# Patient Record
Sex: MALE | Race: WHITE
[De-identification: names, ages, dates, MRNs, and addresses within clinical notes are randomized per-mention and may not be internally consistent; named-entity substitution may affect disease eponyms.]

---

## 2017-01-27 ENCOUNTER — HOSPITAL ENCOUNTER (OUTPATIENT)
Dept: HOSPITAL 74 - FASU | Age: 72
LOS: 1 days | Discharge: HOME | End: 2017-01-28
Attending: SURGERY
Payer: COMMERCIAL

## 2017-01-27 VITALS — HEART RATE: 80 BPM

## 2017-01-27 VITALS — BODY MASS INDEX: 29 KG/M2

## 2017-01-27 DIAGNOSIS — K80.10: Primary | ICD-10-CM

## 2017-01-27 PROCEDURE — 0FT44ZZ RESECTION OF GALLBLADDER, PERCUTANEOUS ENDOSCOPIC APPROACH: ICD-10-PCS | Performed by: SURGERY

## 2017-01-27 PROCEDURE — 3E1M38Z IRRIGATION OF PERITONEAL CAVITY USING IRRIGATING SUBSTANCE, PERCUTANEOUS APPROACH: ICD-10-PCS | Performed by: SURGERY

## 2017-01-27 RX ADMIN — INSULIN ASPART SCH VIAL: 100 INJECTION, SOLUTION INTRAVENOUS; SUBCUTANEOUS at 11:55

## 2017-01-27 RX ADMIN — INSULIN ASPART SCH: 100 INJECTION, SOLUTION INTRAVENOUS; SUBCUTANEOUS at 23:56

## 2017-01-27 RX ADMIN — INSULIN ASPART SCH VIAL: 100 INJECTION, SOLUTION INTRAVENOUS; SUBCUTANEOUS at 18:44

## 2017-01-27 RX ADMIN — MEMANTINE SCH MG: 10 TABLET ORAL at 21:28

## 2017-01-27 NOTE — OP
DATE OF OPERATION:  01/27/2017

 

PREOPERATIVE DIAGNOSES:  Symptomatic cholelithiasis, chronic cholecystitis, 
right

upper quadrant abdominal pain.

 

POSTOPERATIVE DIAGNOSES:  Symptomatic cholelithiasis, chronic cholecystitis, 
right

upper quadrant abdominal pain.

 

PROCEDURE:  Laparoscopic cholecystectomy, peritoneal lavage, extensive lysis of

adhesions.



SURGEON: Benny Alonzo M.D.



ASSISTANT: Cornelio Mcgill D.O.



ANESTHESIA: Hayes Buchanan M.D. (general)

 

ESTIMATED BLOOD LOSS:  Minimal.

 

SPECIMEN:  Gallbladder.

 

INDICATION FOR PROCEDURE:  This is a 71-year-old gentleman who has had a 
multitude of

medical issues who presents with episodic right upper quadrant pain.  Ultrasound

workup demonstrated findings to be consistent with chronic cholecystitis, 
biliary

colic, and cholelithiasis.  The patient is here today for a laparoscopic

cholecystectomy.

 

The patient underwent an extensive lysis of adhesions which will be dictated in 
the

operative report due to the fact of his open prostatectomy.

 

Patient identified and appropriately positioned on the operating room table.  
After

placement of general anesthesia, the abdomen prepped and draped in the usual 
sterile

fashion with ChloraPrep.  An infraumbilical incision was made, deepened through

subcutaneous tissue.  A cutdown was performed on the anterior abdominal wall 
fascia. 

Peritoneum incised.  Under direct vision, a port was placed.  The remaining 3 
ports

were placed under direct vision.  An epigastric 5-mm port.  Placement of this 
camera

and port revealed multiple abdominal adhesions to the anterior abdominal wall 
in the

right side of the abdomen and right upper quadrant.  These adhesions were then

subsequently taken down sharply.  The adhesions consisted of omentum.  There 
was also

omentum that was plastered to the body and dome of the gallbladder and edge of 
the

liver.  These adhesions were taken down in a similar fashion.  The lysis of 
adhesions

was approximately 50% of the operation just that was performed taking down 
adhesions.

 

Once the adhesions were subsequently taken down, the remaining 2 ports in the 
right

upper quadrant were placed, all 5 mm, under direct vision.  The gallbladder 
reflected

over the dome of the liver in standard fashion.  Going from the neck and 
infundibulum

of the gallbladder, the cystic duct was subsequently identified.  The cystic 
duct was

circumferentially isolated, clipped, and then divided.  The cystic artery 
identified

more posteriorly along with the lymph node.  The artery and its branch were

subsequently clipped and then divided.  The gallbladder itself was removed from 
the

liver bed with electrocautery.  Prior to complete removal, the liver bed was

irrigated, the operative field examined and noted to be hemostatic.

 

The area that underwent extensive lysis of adhesions was irrigated and noted to 
be

hemostatic as well.  The gallbladder brought out through the umbilical port 
site. 

The ports removed.  Port sites were hemostatic.  The fascia at the umbilical 
port

site reapproximated with interrupted 0 Vicryl suture.  All skin closed with 4-0

subcuticular Biosyn followed by Dermabond.

 

At the conclusion of the case, sponge and needle counts were correct.

 

ATTESTATION:  Brief operative note handwritten on the preprinted form.  [facility]

will be queried prior to giving any narcotics.

 

 

ENEDINA MONK [id number]

DD: 01/27/2017 11:02

DT: 01/27/2017 12:02

Job #:  [id number]

 

cc:  MD Derek Chavis MD

MTDD

## 2017-01-28 VITALS — SYSTOLIC BLOOD PRESSURE: 158 MMHG | TEMPERATURE: 98.9 F | DIASTOLIC BLOOD PRESSURE: 86 MMHG

## 2017-01-28 RX ADMIN — MEMANTINE SCH MG: 10 TABLET ORAL at 10:01

## 2017-01-28 RX ADMIN — INSULIN ASPART SCH: 100 INJECTION, SOLUTION INTRAVENOUS; SUBCUTANEOUS at 07:00

## 2017-01-30 NOTE — PATH
Surgical Pathology Report



Patient Name:  FARIHA JEFFREY

Accession #:  

[facility]. Rec. #:  [id number]                                                        

   /Age/Gender:  1945 (Age: 71) / M

Account:  [id number]                                                          

             Location: Pending sale to [facility] 

Taken:  2017

Received:  2017

Reported:  2017

Physicians:  Benny Alonzo

  



Specimen(s) Received

 GALLBLADDER 





Clinical History

Cholecystitis, chronic cholelithiasis







Final Diagnosis

GALLBLADDER, CHOLECYSTECTOMY:

CHRONIC CHOLECYSTITIS AND CHOLELITHIASIS.

BENIGN REACTIVE PERICYSTIC LYMPH NODE PRESENT.





***Electronically Signed***

Kal Amaral M.D.





Gross Description

Received in formalin, labeled "gallbladder," is a 9.0 x 2.8 x 1.5 cm.

gallbladder with a 0.2 cm. in length portion of cystic duct attached. There is a

0.7 cm in greatest dimension tan-pink periductal lymph node present. The outer

surface is tan-gray and varies from smooth to shaggy. The lumen contains green,

tenacious bile as well as multiple black, irregular choleliths averaging 0.4 cm

in greatest dimension. The mucosa is tan and velvety. The wall of the

gallbladder averages 0.1 cm. in thickness. Representative sections including one

whole bisected lymph node are submitted in one cassette.

## 2018-06-04 PROBLEM — Z00.00 ENCOUNTER FOR PREVENTIVE HEALTH EXAMINATION: Status: ACTIVE | Noted: 2018-06-04

## 2018-06-09 DIAGNOSIS — Z86.69 PERSONAL HISTORY OF OTHER DISEASES OF THE NERVOUS SYSTEM AND SENSE ORGANS: ICD-10-CM

## 2018-06-09 DIAGNOSIS — Z81.8 FAMILY HISTORY OF OTHER MENTAL AND BEHAVIORAL DISORDERS: ICD-10-CM

## 2018-06-09 DIAGNOSIS — Z86.79 PERSONAL HISTORY OF OTHER DISEASES OF THE CIRCULATORY SYSTEM: ICD-10-CM

## 2018-06-09 DIAGNOSIS — Z82.49 FAMILY HISTORY OF ISCHEMIC HEART DISEASE AND OTHER DISEASES OF THE CIRCULATORY SYSTEM: ICD-10-CM

## 2018-06-09 DIAGNOSIS — M72.0 PALMAR FASCIAL FIBROMATOSIS [DUPUYTREN]: ICD-10-CM

## 2018-06-09 DIAGNOSIS — H33.21 SEROUS RETINAL DETACHMENT, RIGHT EYE: ICD-10-CM

## 2018-06-09 DIAGNOSIS — Z87.19 PERSONAL HISTORY OF OTHER DISEASES OF THE DIGESTIVE SYSTEM: ICD-10-CM

## 2018-06-09 DIAGNOSIS — Z87.39 PERSONAL HISTORY OF OTHER DISEASES OF THE MUSCULOSKELETAL SYSTEM AND CONNECTIVE TISSUE: ICD-10-CM

## 2018-06-12 ENCOUNTER — APPOINTMENT (OUTPATIENT)
Dept: CARDIOLOGY | Facility: CLINIC | Age: 73
End: 2018-06-12

## 2018-06-12 ENCOUNTER — NON-APPOINTMENT (OUTPATIENT)
Age: 73
End: 2018-06-12

## 2018-06-12 VITALS
TEMPERATURE: 98.2 F | BODY MASS INDEX: 31.32 KG/M2 | HEIGHT: 65 IN | DIASTOLIC BLOOD PRESSURE: 80 MMHG | RESPIRATION RATE: 12 BRPM | SYSTOLIC BLOOD PRESSURE: 169 MMHG | HEART RATE: 74 BPM | WEIGHT: 188 LBS | OXYGEN SATURATION: 98 %

## 2018-06-12 DIAGNOSIS — F02.80 ALZHEIMER'S DISEASE, UNSPECIFIED: ICD-10-CM

## 2018-06-12 DIAGNOSIS — G30.9 ALZHEIMER'S DISEASE, UNSPECIFIED: ICD-10-CM

## 2018-06-12 RX ORDER — BISOPROLOL FUMARATE AND HYDROCHLOROTHIAZIDE 10; 6.25 MG/1; MG/1
10-6.25 TABLET, COATED ORAL DAILY
Refills: 0 | Status: DISCONTINUED | COMMUNITY
End: 2018-06-12

## 2018-06-26 ENCOUNTER — APPOINTMENT (OUTPATIENT)
Dept: CARDIOLOGY | Facility: CLINIC | Age: 73
End: 2018-06-26

## 2018-06-26 VITALS
HEART RATE: 92 BPM | TEMPERATURE: 97.9 F | BODY MASS INDEX: 30.66 KG/M2 | OXYGEN SATURATION: 97 % | HEIGHT: 65 IN | SYSTOLIC BLOOD PRESSURE: 163 MMHG | WEIGHT: 184 LBS | DIASTOLIC BLOOD PRESSURE: 94 MMHG | RESPIRATION RATE: 12 BRPM

## 2018-06-26 RX ORDER — CARVEDILOL 6.25 MG/1
6.25 TABLET, FILM COATED ORAL
Qty: 60 | Refills: 3 | Status: DISCONTINUED | COMMUNITY
Start: 2018-06-12 | End: 2018-06-26

## 2018-07-17 ENCOUNTER — MESSAGE (OUTPATIENT)
Age: 73
End: 2018-07-17

## 2018-07-17 RX ORDER — VALSARTAN 320 MG/1
320 TABLET, COATED ORAL DAILY
Refills: 0 | Status: DISCONTINUED | COMMUNITY
End: 2018-07-17

## 2019-05-29 ENCOUNTER — RX RENEWAL (OUTPATIENT)
Age: 74
End: 2019-05-29

## 2019-05-30 ENCOUNTER — RX RENEWAL (OUTPATIENT)
Age: 74
End: 2019-05-30

## 2019-06-25 ENCOUNTER — APPOINTMENT (OUTPATIENT)
Dept: CARDIOLOGY | Facility: CLINIC | Age: 74
End: 2019-06-25

## 2019-07-16 ENCOUNTER — NON-APPOINTMENT (OUTPATIENT)
Age: 74
End: 2019-07-16

## 2019-07-16 ENCOUNTER — APPOINTMENT (OUTPATIENT)
Dept: CARDIOLOGY | Facility: CLINIC | Age: 74
End: 2019-07-16
Payer: MEDICARE

## 2019-07-16 VITALS
BODY MASS INDEX: 32.02 KG/M2 | SYSTOLIC BLOOD PRESSURE: 170 MMHG | WEIGHT: 192.44 LBS | OXYGEN SATURATION: 96 % | DIASTOLIC BLOOD PRESSURE: 90 MMHG | RESPIRATION RATE: 12 BRPM | HEART RATE: 77 BPM

## 2019-07-16 PROCEDURE — 99214 OFFICE O/P EST MOD 30 MIN: CPT

## 2019-07-16 PROCEDURE — 93000 ELECTROCARDIOGRAM COMPLETE: CPT

## 2019-07-16 NOTE — ASSESSMENT
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 Patient currently denies exertional chest pain or dyspnea. \par \par Patient's blood pressure is not controlled, but patient ran out of his losartan and HCTZ ~ 10 days ago. \par Patient was instructed to call the office next time he runs out of his medications.\par Will continue carvedilol 12.5 mg po bid, HCTZ 12.5 mg po daily, amlodipine 10 mg po daily, and losartan 100 mg po daily.\par Will continue aspirin & atorvastatin.\par \par RTC in 2 weeks for BP follow-up to ensure adequate control on current regimen.

## 2019-07-16 NOTE — HISTORY OF PRESENT ILLNESS
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. Patient presents today for follow-up for follow-up. He reports that he ran out of his HCTZ 12.5 mg po daily and losartan 100 mg po daily ~ 10 days ago. Prior to running out of his meds, he reports SBP in 140s at home. Patient denies chest pain, dyspnea, palpitations, syncope, edema, melena, hematochezia, or hematemesis.\par \par PMD: David Garcia MD

## 2019-07-16 NOTE — PHYSICAL EXAM
[General Appearance - Well Developed] : well developed [General Appearance - Well Nourished] : well nourished [General Appearance - In No Acute Distress] : no acute distress [Normal Conjunctiva] : the conjunctiva exhibited no abnormalities [Normal Oropharynx] : normal oropharynx [FreeTextEntry1] : No JVD present [] : no respiratory distress [Respiration, Rhythm And Depth] : normal respiratory rhythm and effort [Auscultation Breath Sounds / Voice Sounds] : lungs were clear to auscultation bilaterally [Abnormal Walk] : normal gait [Nail Clubbing] : no clubbing of the fingernails [Cyanosis, Localized] : no localized cyanosis [Oriented To Time, Place, And Person] : oriented to person, place, and time [Affect] : the affect was normal [Mood] : the mood was normal [Normal Rate] : normal [Rhythm Regular] : regular [Normal S1] : normal S1 [Normal S2] : normal S2 [S3] : no S3 [S4] : no S4 [No Murmur] : no murmurs heard [Right Carotid Bruit] : no bruit heard over the right carotid [Left Carotid Bruit] : no bruit heard over the left carotid [2+] : left 2+ [No Pitting Edema] : no pitting edema present

## 2019-07-30 ENCOUNTER — APPOINTMENT (OUTPATIENT)
Dept: CARDIOLOGY | Facility: CLINIC | Age: 74
End: 2019-07-30
Payer: MEDICARE

## 2019-07-30 VITALS
BODY MASS INDEX: 31.95 KG/M2 | RESPIRATION RATE: 12 BRPM | OXYGEN SATURATION: 98 % | DIASTOLIC BLOOD PRESSURE: 75 MMHG | WEIGHT: 192 LBS | SYSTOLIC BLOOD PRESSURE: 142 MMHG | HEART RATE: 88 BPM

## 2019-07-30 PROCEDURE — 99213 OFFICE O/P EST LOW 20 MIN: CPT

## 2019-07-30 NOTE — HISTORY OF PRESENT ILLNESS
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 Patient presents today for follow-up for blood pressure follow-up after resuming his HCTZ 12.5 mg po daily and losartan 100 mg po daily after running out for several weeks. Patient denies chest pain, dyspnea, palpitations, syncope, edema, melena, hematochezia, or hematemesis.\par \par PMD: David Garcia MD

## 2019-07-30 NOTE — PHYSICAL EXAM
[General Appearance - Well Developed] : well developed [General Appearance - Well Nourished] : well nourished [General Appearance - In No Acute Distress] : no acute distress [Normal Conjunctiva] : the conjunctiva exhibited no abnormalities [Normal Oropharynx] : normal oropharynx [] : no respiratory distress [Respiration, Rhythm And Depth] : normal respiratory rhythm and effort [Auscultation Breath Sounds / Voice Sounds] : lungs were clear to auscultation bilaterally [Abnormal Walk] : normal gait [Nail Clubbing] : no clubbing of the fingernails [Cyanosis, Localized] : no localized cyanosis [Oriented To Time, Place, And Person] : oriented to person, place, and time [Normal Rate] : normal [Affect] : the affect was normal [Mood] : the mood was normal [Normal S1] : normal S1 [Normal S2] : normal S2 [Rhythm Regular] : regular [No Murmur] : no murmurs heard [2+] : left 2+ [No Pitting Edema] : no pitting edema present [FreeTextEntry1] : No JVD present [S3] : no S3 [S4] : no S4 [Right Carotid Bruit] : no bruit heard over the right carotid [Left Carotid Bruit] : no bruit heard over the left carotid

## 2019-07-30 NOTE — ASSESSMENT
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 Patient currently denies exertional chest pain or dyspnea. \par \par Patient's blood pressure is better controlled since restarting losartan and HCTZ on 7/16/19 after running out of his medications.\par Will continue carvedilol 12.5 mg po bid, HCTZ 12.5 mg po daily, amlodipine 10 mg po daily, and losartan 100 mg po daily.\par Patient was instructed to call the office next time he runs out of his medications.\par Will continue aspirin & atorvastatin.\par \par RTC in 4 months

## 2020-11-10 ENCOUNTER — APPOINTMENT (OUTPATIENT)
Dept: CARDIOLOGY | Facility: CLINIC | Age: 75
End: 2020-11-10

## 2020-11-24 ENCOUNTER — RESULT REVIEW (OUTPATIENT)
Age: 75
End: 2020-11-24

## 2020-11-24 ENCOUNTER — APPOINTMENT (OUTPATIENT)
Dept: CARDIOLOGY | Facility: CLINIC | Age: 75
End: 2020-11-24
Payer: MEDICARE

## 2020-11-24 ENCOUNTER — NON-APPOINTMENT (OUTPATIENT)
Age: 75
End: 2020-11-24

## 2020-11-24 VITALS
OXYGEN SATURATION: 96 % | RESPIRATION RATE: 12 BRPM | HEART RATE: 66 BPM | SYSTOLIC BLOOD PRESSURE: 130 MMHG | WEIGHT: 185 LBS | BODY MASS INDEX: 30.79 KG/M2 | TEMPERATURE: 97.9 F | DIASTOLIC BLOOD PRESSURE: 62 MMHG

## 2020-11-24 DIAGNOSIS — I73.9 PERIPHERAL VASCULAR DISEASE, UNSPECIFIED: ICD-10-CM

## 2020-11-24 DIAGNOSIS — Z23 ENCOUNTER FOR IMMUNIZATION: ICD-10-CM

## 2020-11-24 PROCEDURE — 99214 OFFICE O/P EST MOD 30 MIN: CPT

## 2020-11-24 PROCEDURE — 93000 ELECTROCARDIOGRAM COMPLETE: CPT

## 2020-11-24 PROCEDURE — G0008: CPT

## 2020-11-24 PROCEDURE — 90662 IIV NO PRSV INCREASED AG IM: CPT

## 2020-11-24 NOTE — HISTORY OF PRESENT ILLNESS
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 Patient presents today for follow-up. He denies exertional chest pain or dyspnea. However, he does report bilateral leg pain when walking but not at rest. Patient denies palpitations, syncope, edema, melena, hematochezia, or hematemesis.\par \par PMD: David Garcia MD

## 2020-11-24 NOTE — PHYSICAL EXAM
[General Appearance - Well Developed] : well developed [General Appearance - Well Nourished] : well nourished [General Appearance - In No Acute Distress] : no acute distress [Normal Conjunctiva] : the conjunctiva exhibited no abnormalities [Normal Oropharynx] : normal oropharynx [] : no respiratory distress [Respiration, Rhythm And Depth] : normal respiratory rhythm and effort [Auscultation Breath Sounds / Voice Sounds] : lungs were clear to auscultation bilaterally [Nail Clubbing] : no clubbing of the fingernails [Cyanosis, Localized] : no localized cyanosis [Oriented To Time, Place, And Person] : oriented to person, place, and time [Affect] : the affect was normal [Mood] : the mood was normal [Normal Rate] : normal [Rhythm Regular] : regular [Normal S1] : normal S1 [Normal S2] : normal S2 [No Murmur] : no murmurs heard [2+] : left 2+ [No Pitting Edema] : no pitting edema present [FreeTextEntry1] : No JVD present [S3] : no S3 [S4] : no S4 [Right Carotid Bruit] : no bruit heard over the right carotid [Left Carotid Bruit] : no bruit heard over the left carotid [1+] : left 1+

## 2020-11-24 NOTE — ASSESSMENT
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 \par \par Patient is clinically stable from CAD standpoint and will continue to monitor.\par Will continue current meds (clopidogrel and bisoprolol). However, will change ezetimibe-simvastatin (10 mg/20 mg) to atorvastatin 40 mg po daily for now now given known CAD. \par Will try to obtain recent labs from PMD's office for review.\par \par Given reported bilateral leg when walking (claudication) and diminished peripheral pulses, will order bilateral arterial Doppler for evaluation of PAD. \par \par Blood pressure is adequately controlled on bisoprolol 10 mg po daily and nifedipine ER 60 mg po daily. \par Will continue to monitor on current regimen.\par \par Influenza vaccination was given today in the office at patient's request.

## 2020-12-08 ENCOUNTER — NON-APPOINTMENT (OUTPATIENT)
Age: 75
End: 2020-12-08

## 2020-12-08 ENCOUNTER — APPOINTMENT (OUTPATIENT)
Dept: CARDIOLOGY | Facility: CLINIC | Age: 75
End: 2020-12-08
Payer: MEDICARE

## 2020-12-08 VITALS
BODY MASS INDEX: 32.28 KG/M2 | HEART RATE: 69 BPM | WEIGHT: 194 LBS | OXYGEN SATURATION: 96 % | DIASTOLIC BLOOD PRESSURE: 65 MMHG | SYSTOLIC BLOOD PRESSURE: 124 MMHG | TEMPERATURE: 97.6 F | RESPIRATION RATE: 12 BRPM

## 2020-12-08 PROCEDURE — 99213 OFFICE O/P EST LOW 20 MIN: CPT

## 2020-12-08 NOTE — REASON FOR VISIT
[Follow-Up - Clinic] : a clinic follow-up of [Coronary Artery Disease] : coronary artery disease [Hyperlipidemia] : hyperlipidemia [Hypertension] : hypertension [FreeTextEntry1] : leg edema

## 2020-12-08 NOTE — PHYSICAL EXAM
[General Appearance - Well Developed] : well developed [General Appearance - Well Nourished] : well nourished [General Appearance - In No Acute Distress] : no acute distress [Normal Conjunctiva] : the conjunctiva exhibited no abnormalities [Normal Oropharynx] : normal oropharynx [] : no respiratory distress [Respiration, Rhythm And Depth] : normal respiratory rhythm and effort [Auscultation Breath Sounds / Voice Sounds] : lungs were clear to auscultation bilaterally [Nail Clubbing] : no clubbing of the fingernails [Cyanosis, Localized] : no localized cyanosis [Oriented To Time, Place, And Person] : oriented to person, place, and time [Affect] : the affect was normal [Mood] : the mood was normal [Normal Rate] : normal [Rhythm Regular] : regular [Normal S1] : normal S1 [Normal S2] : normal S2 [No Murmur] : no murmurs heard [2+] : left 2+ [1+] : left 1+ [No Pitting Edema] : no pitting edema present [FreeTextEntry1] : No JVD present [S3] : no S3 [S4] : no S4 [Right Carotid Bruit] : no bruit heard over the right carotid [Left Carotid Bruit] : no bruit heard over the left carotid

## 2020-12-08 NOTE — REVIEW OF SYSTEMS
[Negative] : Heme/Lymph [Lower Ext Edema] : lower extremity edema [Shortness Of Breath] : no shortness of breath [Dyspnea on exertion] : not dyspnea during exertion [Chest Pain] : no chest pain [Palpitations] : no palpitations

## 2020-12-08 NOTE — ASSESSMENT
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 \par \par Patient with reported intermittent leg edema. However, only noted to have trace pedal edema today in office.\par Will start furosemide 20 mg po daily and return in 2-3 weeks for follow-up and labs.\par His leg edema may be due to nifedipine.\par \par Patient is clinically stable from CAD standpoint and will continue to monitor.\par Will continue current meds (clopidogrel, atorvastatin, and bisoprolol). \par \par Blood pressure is adequately controlled on bisoprolol 10 mg po daily and nifedipine ER 60 mg po daily. \par Will continue to monitor on current regimen.

## 2020-12-08 NOTE — HISTORY OF PRESENT ILLNESS
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 Patient presents today for follow-up after being given Rx for furosemide 20 mg po daily on 12/1/20 for reported pedal edema. However, patient reports that he did not pick-up the medication. Patient denies chest pain, dyspnea, palpitations, syncope, melena, hematochezia, or hematemesis. Patient's wife reports that the severity of his leg edema varies on a daily basis but reporte more significant swelling last week.\par \par PMD: David Garcia MD

## 2020-12-31 PROBLEM — G30.9 ALZHEIMER'S DEMENTIA: Status: RESOLVED | Noted: 2018-06-12 | Resolved: 2020-12-31

## 2021-03-05 ENCOUNTER — RESULT REVIEW (OUTPATIENT)
Age: 76
End: 2021-03-05

## 2021-03-05 ENCOUNTER — APPOINTMENT (OUTPATIENT)
Dept: NEPHROLOGY | Facility: CLINIC | Age: 76
End: 2021-03-05
Payer: MEDICARE

## 2021-03-05 VITALS
DIASTOLIC BLOOD PRESSURE: 82 MMHG | TEMPERATURE: 97.7 F | HEART RATE: 58 BPM | SYSTOLIC BLOOD PRESSURE: 148 MMHG | BODY MASS INDEX: 31.5 KG/M2 | WEIGHT: 196 LBS | HEIGHT: 66 IN | OXYGEN SATURATION: 96 %

## 2021-03-05 DIAGNOSIS — T39.395S ADVERSE EFFECT OF OTHER NONSTEROIDAL ANTI-INFLAMMATORY DRUGS [NSAID], SEQUELA: ICD-10-CM

## 2021-03-05 DIAGNOSIS — N40.1 BENIGN PROSTATIC HYPERPLASIA WITH LOWER URINARY TRACT SYMPMS: ICD-10-CM

## 2021-03-05 DIAGNOSIS — R35.1 NOCTURIA: ICD-10-CM

## 2021-03-05 DIAGNOSIS — N13.8 BENIGN PROSTATIC HYPERPLASIA WITH LOWER URINARY TRACT SYMPMS: ICD-10-CM

## 2021-03-05 PROCEDURE — 36415 COLL VENOUS BLD VENIPUNCTURE: CPT

## 2021-03-05 PROCEDURE — 99204 OFFICE O/P NEW MOD 45 MIN: CPT | Mod: 25

## 2021-03-05 RX ORDER — NIFEDIPINE 60 MG/1
60 TABLET, EXTENDED RELEASE ORAL DAILY
Refills: 0 | Status: DISCONTINUED | COMMUNITY
End: 2021-03-05

## 2021-03-05 RX ORDER — ATORVASTATIN CALCIUM 40 MG/1
40 TABLET, FILM COATED ORAL
Qty: 90 | Refills: 3 | Status: DISCONTINUED | COMMUNITY
Start: 2020-11-24 | End: 2021-03-05

## 2021-03-05 RX ORDER — DAPAGLIFLOZIN 10 MG/1
10 TABLET, FILM COATED ORAL DAILY
Refills: 0 | Status: DISCONTINUED | COMMUNITY
End: 2021-03-05

## 2021-03-05 NOTE — HISTORY OF PRESENT ILLNESS
[FreeTextEntry1] : 77 yo male with longstanding DM ( > 20yrs), HTN, high cholesterol, CAD ( s/p CABG 10/2016) referred by Dr. Garcia for elevated cr - per wife was  seen by MD in [place], DR and cr was 2.96 10/5/2020 - was hospitalize there for dehydration - upon d/c , [place] and was seen by PCP Dr. Garcia 12/2020 2.2. GFR 28 \par \par Was on nifedipine - d/c'd 2/2 to edema and resumed losartan ( had been on it n the past, but stopped 2/2 concerns that to may cause cancer)\par \par FHX; -ve for CKD\par PMH: as abpve\par SH:  - used to drink daily ( quit > 10 yrs ago), no smoking\par \par \par  Notes from Dr. Aj Cardiology reviewed\par ECHO report reviewed from [facility] - EF 65%\par \par \par

## 2021-03-05 NOTE — ASSESSMENT
[FreeTextEntry1] : CKD 4 as far back as 10/2020 when admitted to hosp in [place]\par - cr 2.96 (10/2020)--> 2.2 in 12/2020\par - no labs since\par - appropriately on arb\par \par HTN\par - under treatement\par \par \par BPH\par - nocturia\par - catheter in past\par - refer to Urology \par \par \par - check BMET\par - check Uptn/cr\par - check US\par - refer Urologist\par - counseled on avoidance of NSAIDs

## 2021-03-05 NOTE — PHYSICAL EXAM
[General Appearance - Alert] : alert [General Appearance - In No Acute Distress] : in no acute distress [Sclera] : the sclera and conjunctiva were normal [Extraocular Movements] : extraocular movements were intact [Outer Ear] : the ears and nose were normal in appearance [Hearing Threshold Finger Rub Not [place]] : hearing was normal [Neck Appearance] : the appearance of the neck was normal [Neck Cervical Mass (___cm)] : no neck mass was observed [] : no respiratory distress [Exaggerated Use Of Accessory Muscles For Inspiration] : no accessory muscle use [Apical Impulse] : the apical impulse was normal [Heart Sounds] : normal S1 and S2 [Edema] : there was no peripheral edema [Veins - Varicosity Changes] : there were no varicosital changes [Bowel Sounds] : normal bowel sounds [Abdomen Tenderness] : non-tender [No CVA Tenderness] : no ~M costovertebral angle tenderness [No Spinal Tenderness] : no spinal tenderness [Abnormal Walk] : normal gait [Nail Clubbing] : no clubbing  or cyanosis of the fingernails [Oriented To Time, Place, And Person] : oriented to person, place, and time [Impaired Insight] : insight and judgment were intact

## 2021-03-10 ENCOUNTER — RESULT REVIEW (OUTPATIENT)
Age: 76
End: 2021-03-10

## 2021-03-12 ENCOUNTER — NON-APPOINTMENT (OUTPATIENT)
Age: 76
End: 2021-03-12

## 2021-03-12 ENCOUNTER — APPOINTMENT (OUTPATIENT)
Dept: CARDIOLOGY | Facility: CLINIC | Age: 76
End: 2021-03-12
Payer: MEDICARE

## 2021-03-12 VITALS
BODY MASS INDEX: 32.28 KG/M2 | OXYGEN SATURATION: 97 % | TEMPERATURE: 97.3 F | DIASTOLIC BLOOD PRESSURE: 80 MMHG | WEIGHT: 200 LBS | HEART RATE: 63 BPM | SYSTOLIC BLOOD PRESSURE: 160 MMHG | RESPIRATION RATE: 12 BRPM

## 2021-03-12 VITALS
OXYGEN SATURATION: 97 % | TEMPERATURE: 97.3 F | HEART RATE: 63 BPM | DIASTOLIC BLOOD PRESSURE: 80 MMHG | SYSTOLIC BLOOD PRESSURE: 160 MMHG | WEIGHT: 200 LBS | BODY MASS INDEX: 32.28 KG/M2

## 2021-03-12 PROCEDURE — 99214 OFFICE O/P EST MOD 30 MIN: CPT

## 2021-03-12 PROCEDURE — 93000 ELECTROCARDIOGRAM COMPLETE: CPT

## 2021-03-12 RX ORDER — LOSARTAN POTASSIUM 50 MG/1
50 TABLET, FILM COATED ORAL DAILY
Qty: 90 | Refills: 1 | Status: DISCONTINUED | COMMUNITY
End: 2021-03-12

## 2021-03-12 NOTE — HISTORY OF PRESENT ILLNESS
[FreeTextEntry1] : 75 yo male with HTN, DM type 2, PAD, hyperlipidemia, severe multivessel CAD -> CABG 10/10/16 (LIMA to LAD, LIMA-DWAYNE to OM, SVG to PDA) at [facility]. Patient presents today for follow-up. Doing well. Patient denies chest pain, dyspnea, palpitations, syncope, edema, melena, hematochezia, or hematemesis. He reports that his nifedipine was discontinued in Jan 2021 by his PMD due to leg edema and was replaced with losartan 50 mg po daily. Patient denies chest pain, dyspnea, palpitations, syncope, melena, hematochezia, or hematemesis. \par \par PMD: David Garcia MD\par Renal: Grant Lunsford MD

## 2021-03-12 NOTE — PHYSICAL EXAM
[General Appearance - Well Developed] : well developed [General Appearance - Well Nourished] : well nourished [General Appearance - In No Acute Distress] : no acute distress [] : no respiratory distress [Respiration, Rhythm And Depth] : normal respiratory rhythm and effort [Auscultation Breath Sounds / Voice Sounds] : lungs were clear to auscultation bilaterally [Nail Clubbing] : no clubbing of the fingernails [Cyanosis, Localized] : no localized cyanosis [Normal Rate] : normal [Rhythm Regular] : regular [Normal S1] : normal S1 [Normal S2] : normal S2 [No Murmur] : no murmurs heard [2+] : left 2+ [1+] : left 1+ [No Pitting Edema] : no pitting edema present [Normal Conjunctiva] : the conjunctiva exhibited no abnormalities [Oriented To Time, Place, And Person] : oriented to person, place, and time [Affect] : the affect was normal [Mood] : the mood was normal [FreeTextEntry1] : No JVD present [S3] : no S3 [S4] : no S4 [Right Carotid Bruit] : no bruit heard over the right carotid [Left Carotid Bruit] : no bruit heard over the left carotid

## 2021-04-01 ENCOUNTER — LABORATORY RESULT (OUTPATIENT)
Age: 76
End: 2021-04-01

## 2021-04-01 ENCOUNTER — APPOINTMENT (OUTPATIENT)
Dept: CARDIOLOGY | Facility: CLINIC | Age: 76
End: 2021-04-01
Payer: MEDICARE

## 2021-04-01 PROCEDURE — 36415 COLL VENOUS BLD VENIPUNCTURE: CPT

## 2021-04-05 ENCOUNTER — APPOINTMENT (OUTPATIENT)
Dept: CARDIOLOGY | Facility: CLINIC | Age: 76
End: 2021-04-05
Payer: MEDICARE

## 2021-04-05 VITALS
TEMPERATURE: 97.6 F | HEART RATE: 63 BPM | DIASTOLIC BLOOD PRESSURE: 86 MMHG | BODY MASS INDEX: 31.8 KG/M2 | WEIGHT: 197 LBS | SYSTOLIC BLOOD PRESSURE: 200 MMHG | RESPIRATION RATE: 12 BRPM | OXYGEN SATURATION: 98 %

## 2021-04-05 LAB
CHOLEST SERPL-MCNC: 150 MG/DL
HDLC SERPL-MCNC: 25 MG/DL
LDLC SERPL CALC-MCNC: 48 MG/DL
NONHDLC SERPL-MCNC: 125 MG/DL
TRIGL SERPL-MCNC: 387 MG/DL

## 2021-04-05 PROCEDURE — 99214 OFFICE O/P EST MOD 30 MIN: CPT

## 2021-04-05 NOTE — HISTORY OF PRESENT ILLNESS
[FreeTextEntry1] : 75 yo male with HTN, DM type 2, PAD, hyperlipidemia, severe multivessel CAD -> CABG 10/10/16 (LIMA to LAD, LIMA-DWAYNE to OM, SVG to PDA) at [facility]. Patient presents today for follow-up. Patient denies chest pain, dyspnea, palpitations, syncope, edema, melena, hematochezia, or hematemesis. Patient's wife reports that his BP is still not controlled despite increase in losartan to 100 mg po daily. Patient denies chest pain, dyspnea, palpitations, syncope, melena, hematochezia, or hematemesis. \par \par PMD: David Garcia MD\par Renal: Grant Lunsford MD

## 2021-04-05 NOTE — ASSESSMENT
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 \par \par Blood pressure is not adequately controlled on bisoprolol 10 mg po daily, furosemide 20 mg po daily, and losartan 100 mg po daily.\par Nifedipine was discontinued by PMD due to leg edema.\par Will add clonidine 0.1 mg po bid for better BP control. \par BMP was ordered today to be run from 4/1/21 labs. Lab was called and confirmed that this could be run from that blood draw. Will call with results.\par \par Patient is clinically stable from CAD standpoint.\par Will continue to monitor on current meds (clopidogrel, atorvastatin, and bisoprolol). \par \par LDL = 48 per 4/1/21 labs. Will continue simvastatin 20 mg po daily at this time.

## 2021-04-05 NOTE — PHYSICAL EXAM
[General Appearance - Well Developed] : well developed [General Appearance - Well Nourished] : well nourished [General Appearance - In No Acute Distress] : no acute distress [Normal Conjunctiva] : the conjunctiva exhibited no abnormalities [] : no respiratory distress [Respiration, Rhythm And Depth] : normal respiratory rhythm and effort [Auscultation Breath Sounds / Voice Sounds] : lungs were clear to auscultation bilaterally [Nail Clubbing] : no clubbing of the fingernails [Cyanosis, Localized] : no localized cyanosis [Oriented To Time, Place, And Person] : oriented to person, place, and time [Affect] : the affect was normal [Mood] : the mood was normal [Normal Rate] : normal [Rhythm Regular] : regular [Normal S1] : normal S1 [Normal S2] : normal S2 [No Murmur] : no murmurs heard [2+] : left 2+ [1+] : left 1+ [No Pitting Edema] : no pitting edema present [No Oral Cyanosis] : no oral cyanosis [FreeTextEntry1] : No JVD present [Abnormal Walk] : normal gait [S3] : no S3 [S4] : no S4 [Right Carotid Bruit] : no bruit heard over the right carotid [Left Carotid Bruit] : no bruit heard over the left carotid

## 2021-04-16 ENCOUNTER — APPOINTMENT (OUTPATIENT)
Dept: GASTROENTEROLOGY | Facility: CLINIC | Age: 76
End: 2021-04-16

## 2021-04-20 ENCOUNTER — APPOINTMENT (OUTPATIENT)
Dept: ENDOCRINOLOGY | Facility: CLINIC | Age: 76
End: 2021-04-20
Payer: MEDICARE

## 2021-04-20 VITALS
OXYGEN SATURATION: 98 % | HEART RATE: 68 BPM | DIASTOLIC BLOOD PRESSURE: 80 MMHG | SYSTOLIC BLOOD PRESSURE: 140 MMHG | BODY MASS INDEX: 31.18 KG/M2 | HEIGHT: 66 IN | WEIGHT: 194 LBS

## 2021-04-20 DIAGNOSIS — E11.65 TYPE 2 DIABETES MELLITUS WITH HYPERGLYCEMIA: ICD-10-CM

## 2021-04-20 PROCEDURE — 99215 OFFICE O/P EST HI 40 MIN: CPT

## 2021-04-23 PROBLEM — E11.65 DIABETES TYPE 2, UNCONTROLLED: Status: ACTIVE | Noted: 2021-04-23

## 2021-04-23 NOTE — HISTORY OF PRESENT ILLNESS
[FreeTextEntry1] : Apr 20, 2021    accompanied by daughter - Ava Parrish291 [phone] \par \par PCP:  Dr. David Garcia\par          Card:  Dr. Marlon Aj (ashd)\par          Neph:  Dr. Grant Lunsford      \par          Urol:  Dr. Lloyd Ortiz\par \par CC:  Diabetes  12/9/20 A1c 7.8 %, glucose 221  \par         CRF    4/6/21:  creat 2.94   \par         (depression/memory loss)\par         (ashd)\par        (prehypothyroid)  12/9/20 TSH 6.32\par        (low vitamin D)  \par \par 77 yo visits for diabetes.   Present for many years.  \par He tests his fingerstick sugars at least 4X a day and injects\par Novolin R FLEXPEN  TID by sliding scale:  BS under 150:  0;  BS over 150  10;   (lunch and dinner usually 8)  \par Tresiba 30 in AM and 20 in PM \par \par : :\par Constitutional:  Alert, well nourished, healthy appearance, no acute distress \par Eyes:  No proptosis, no stare\par Thyroid:\par Pulmonary:  No respiratory distress, no accessory muscle use; normal rate and effort\par Cardiac:  Normal rate\par Vascular: \par Endocrine:  No stigmata of Cushing’s Syndrome\par Musculoskeletal:  Normal gait, no involuntary movements\par Neurology:  No tremors\par Affect/Mood/Psych:  Oriented x 3; normal affect, normal insight/judgement, normal mood \par .\par \par Impression: Decreased GFR makes control of blood sugars more challenging. \par \par Plan:  Reviewed dietary strategies, guidelines, data,\par instructed in CGM with [employer]\par ROV in several weeks (when he returns from trip).\par Thank you.  -laura

## 2021-06-09 ENCOUNTER — APPOINTMENT (OUTPATIENT)
Dept: NEPHROLOGY | Facility: CLINIC | Age: 76
End: 2021-06-09

## 2021-07-06 ENCOUNTER — APPOINTMENT (OUTPATIENT)
Dept: CARDIOLOGY | Facility: CLINIC | Age: 76
End: 2021-07-06

## 2021-08-04 ENCOUNTER — RX RENEWAL (OUTPATIENT)
Age: 76
End: 2021-08-04

## 2021-08-23 ENCOUNTER — APPOINTMENT (OUTPATIENT)
Dept: CARDIOLOGY | Facility: CLINIC | Age: 76
End: 2021-08-23
Payer: MEDICARE

## 2021-08-23 VITALS
BODY MASS INDEX: 31.31 KG/M2 | DIASTOLIC BLOOD PRESSURE: 88 MMHG | OXYGEN SATURATION: 99 % | SYSTOLIC BLOOD PRESSURE: 142 MMHG | RESPIRATION RATE: 12 BRPM | HEART RATE: 61 BPM | WEIGHT: 194 LBS

## 2021-08-23 PROCEDURE — 99214 OFFICE O/P EST MOD 30 MIN: CPT

## 2021-08-23 RX ORDER — TORSEMIDE 20 MG/1
20 TABLET ORAL DAILY
Qty: 90 | Refills: 2 | Status: DISCONTINUED | COMMUNITY
Start: 2021-08-23 | End: 2021-08-23

## 2021-08-23 RX ORDER — ATORVASTATIN CALCIUM 40 MG/1
40 TABLET, FILM COATED ORAL
Qty: 90 | Refills: 3 | Status: ACTIVE | COMMUNITY
Start: 2021-08-23 | End: 1900-01-01

## 2021-08-23 RX ORDER — ATORVASTATIN CALCIUM 40 MG/1
40 TABLET, FILM COATED ORAL DAILY
Qty: 90 | Refills: 3 | Status: DISCONTINUED | COMMUNITY
Start: 2021-08-23 | End: 2021-08-23

## 2021-08-23 RX ORDER — EZETIMIBE 10 MG/1
10 TABLET ORAL
Qty: 90 | Refills: 0 | Status: ACTIVE | COMMUNITY
Start: 2020-11-24

## 2021-08-23 NOTE — ASSESSMENT
[FreeTextEntry1] : 77 yo male with HTN, DM type 2, PAD, hyperlipidemia,CKD,  severe multivessel CAD -> CABG 10/10/16 (LIMA to LAD, LIMA-DWAYNE to OM, SVG to PDA). \par Labs on 8/10/21 from PMD's office were reviewed today.\par \par Blood pressure is mildly elevated on bisoprolol 10 mg po daily, torsemide 20 mg po daily, and losartan 50 mg po daily (which was lowered while he was in the [facility] [place] by nephrologist there).\par Will resume clonidine 0.1 mg po bid. \par Nifedipine was discontinued by PMD due to leg edema.\par Patient to call if BP remains uncontrolled.\par \par Patient is clinically stable from CAD standpoint.\par Will continue to monitor on current meds (clopidogrel and bisoprolol). \par \par Given known CAD, will discontinue simvastatin 20 mg po daily and replace with atorvastatin 40 mg po daily.\par Will continue Zetia 10 mg po daily at this time.\par Will repeat lipid panel at next follow-up appointment in 6 months.

## 2021-08-23 NOTE — HISTORY OF PRESENT ILLNESS
[FreeTextEntry1] : 77 yo male with HTN, DM type 2, PAD, hyperlipidemia, CKD, severe multivessel CAD -> CABG 10/10/16 (LIMA to LAD, LIMA-DWAYNE to OM, SVG to PDA) at [facility]. Patient presents today for follow-up. Patient denies chest pain, dyspnea, palpitations, syncope, edema, melena, hematochezia, or hematemesis. Patient's wife reports that his BP meds and cholesterol meds were changed by physicians during his recent trip to [place]. Wife also reports that his clonidine 0.1 mg po bid was discontinued by his PMD. However, due to recurrent episodes of hypertension with reported SBP as high as 170s at home, the patient resumed the clonidine 0.1 mg po bid for better control. Patient denies chest pain, dyspnea, palpitations, syncope, melena, hematochezia, or hematemesis. Patient and wife are requesting clarification of antihypertensive and cholesterol medications. \par \par PMD: David Garcia MD\par Renal: Grant Lunsford MD

## 2021-08-23 NOTE — CARDIOLOGY SUMMARY
[de-identified] : \par 3/12/21 ECG: Sinus, rate 63 bpm, RSR' pattern in V1, inferior infarct, poor R wave progression, non-specific T wave abnormalities\par 11/24/20 ECG: Sinus, rate 66 bpm, RSR' pattern in V1, inferior infarct, poor R wave progression\par 7/16/19 ECG: Sinus, rate 78 bpm, RSR' pattern in V1, inferior infarct\par  [de-identified] : \par 9/2/16 Regadenoson sestamibi: Medium sized moderate infarct of basal to mid inferior and inferolateral walls. Moderate hypokinesis of basal to mid inferior and inferolateral walls. LVEF 48%.\par \par 9/2/16 Regadenoson sestamibi: Medium sized moderate infarct of basal to mid inferior and inferolateral walls. Moderate hypokinesis of basal to mid inferior and inferolateral walls. LVEF 48%.\par  [de-identified] : \par 10/10/16 Cherie-op MINESH (at [facility]): Normal LV size and systolic function, LVEF 60%. Mild conc LVH. Min MR. Thoracic aorta atheroma (Lewis) grade II (severe intimal thickening).\par \par 8/30/16 Echo: Mild conc LVH. AV sclerosis. Mild TR.\par  [de-identified] :   \par 9/14/16 Cardiac cath:\par 30% LM\par 40% prox LAD, 95% mid LAD\par 95% D1 (small vessel), 99% D2 (small vessel)\par 90% LAD septal \par 99% distal Cfx\par 70% OM1 (FFR = 0.82) with severe stenosis of smaller inferior branch\par 90% prox RCA, 95% mid RCA (severe diffuse disease), 70% distal RCA\par 95% ostial rPDA\par LVEF 44%, diaphragmatic & posterior basal akinesis

## 2021-09-21 ENCOUNTER — NON-APPOINTMENT (OUTPATIENT)
Age: 76
End: 2021-09-21

## 2021-09-21 ENCOUNTER — APPOINTMENT (OUTPATIENT)
Dept: ENDOCRINOLOGY | Facility: CLINIC | Age: 76
End: 2021-09-21

## 2022-04-21 ENCOUNTER — APPOINTMENT (OUTPATIENT)
Dept: CARDIOLOGY | Facility: CLINIC | Age: 77
End: 2022-04-21

## 2022-07-06 ENCOUNTER — APPOINTMENT (OUTPATIENT)
Dept: CARDIOLOGY | Facility: CLINIC | Age: 77
End: 2022-07-06

## 2022-07-06 ENCOUNTER — NON-APPOINTMENT (OUTPATIENT)
Age: 77
End: 2022-07-06

## 2022-07-06 VITALS
HEART RATE: 62 BPM | BODY MASS INDEX: 30.02 KG/M2 | WEIGHT: 186 LBS | SYSTOLIC BLOOD PRESSURE: 122 MMHG | DIASTOLIC BLOOD PRESSURE: 62 MMHG | OXYGEN SATURATION: 99 %

## 2022-07-06 DIAGNOSIS — R60.0 LOCALIZED EDEMA: ICD-10-CM

## 2022-07-06 PROCEDURE — 99214 OFFICE O/P EST MOD 30 MIN: CPT

## 2022-07-06 PROCEDURE — 93000 ELECTROCARDIOGRAM COMPLETE: CPT

## 2022-07-06 PROCEDURE — 36415 COLL VENOUS BLD VENIPUNCTURE: CPT

## 2022-07-06 RX ORDER — MEMANTINE HYDROCHLORIDE 10 MG/1
10 TABLET ORAL DAILY
Refills: 0 | Status: ACTIVE | COMMUNITY

## 2022-07-06 RX ORDER — INSULIN DEGLUDEC INJECTION 100 U/ML
100 INJECTION, SOLUTION SUBCUTANEOUS
Qty: 15 | Refills: 0 | Status: ACTIVE | COMMUNITY
Start: 2022-06-28

## 2022-07-06 NOTE — PHYSICAL EXAM
[Well Nourished] : well nourished [No Acute Distress] : no acute distress [Normal Conjunctiva] : normal conjunctiva [Normal Venous Pressure] : normal venous pressure [No Carotid Bruit] : no carotid bruit [Normal S1, S2] : normal S1, S2 [No Murmur] : no murmur [No Rub] : no rub [No Gallop] : no gallop [Clear Lung Fields] : clear lung fields [Good Air Entry] : good air entry [No Respiratory Distress] : no respiratory distress  [No Cyanosis] : no cyanosis [No Clubbing] : no clubbing

## 2022-07-06 NOTE — HISTORY OF PRESENT ILLNESS
[FreeTextEntry1] : 76 yo male with HTN, DM type 2, PAD, hyperlipidemia, CKD, severe multivessel CAD -> CABG 10/10/16 (LIMA to LAD, LIMA-DWAYNE to OM, SVG to PDA). Patient presents today for follow-up. Patient denies chest pain, dyspnea, palpitations, syncope, edema, melena, hematochezia, or hematemesis. \par \par PMD: David Garcia MD\par Renal: Grant Lunsford MD

## 2022-07-06 NOTE — ASSESSMENT
[FreeTextEntry1] : 77 yo male with HTN, DM type 2, PAD, hyperlipidemia,CKD,  severe multivessel CAD -> CABG 10/10/16 (LIMA to LAD, LIMA-DWAYNE to OM, SVG to PDA). \par ECG today demonstrated sinus rhythm with RSR' pattern in V1, inferolateral infarct, nonspecific T wave abnormalities, and poor R wave progression\par \par Patient is clinically stable from CAD standpoint and denies exertional chest pain or dyspnea. \par Will continue to monitor on clopidogrel, bisoprolol, atorvastatin, and Zetia.\par Will check labs (CBC, CMP, lipid panel) today.\par \par Given previous LVEF 44% per 9/2016 cardiac cath, will repeat echocardiogram to reassess LVEF on current medical therapy following 10/2016 CABG.\par Pending review of echocardiogram, will determine if further cardiac evaluation is warranted.\par \par Blood pressure is controlled.\par Will continue bisoprolol 10 mg po daily, torsemide 20 mg po daily, clonidine 0.1 mg po bid, and nifedipine ER 30 mg po daily.  \par Nifedipine was had been discontinued by PMD in past due to leg edema, however patient is currently taking this medication again as prescribed by physician in [facility] [place]. \par \par Patient was given contact information for [place] neurologist for further evaluation/management of peripheral neuropathy.\par \par Patient was advised to contact Dr. Lunsford for follow-up regarding his CKD.

## 2022-07-06 NOTE — CARDIOLOGY SUMMARY
[de-identified] : \par 7/6/22 ECG: Sinus rhythm, rate 60 bpm, RSR' pattern in V1, inferolateral infarct, nonspecific T wave abnormalities, poor R wave progression\par 3/12/21 ECG: Sinus, rate 63 bpm, RSR' pattern in V1, inferior infarct, poor R wave progression, non-specific T wave abnormalities\par 11/24/20 ECG: Sinus, rate 66 bpm, RSR' pattern in V1, inferior infarct, poor R wave progression\par 7/16/19 ECG: Sinus, rate 78 bpm, RSR' pattern in V1, inferior infarct\par  [de-identified] : \par 9/2/16 Regadenoson sestamibi: Medium sized moderate infarct of basal to mid inferior and inferolateral walls. Moderate hypokinesis of basal to mid inferior and inferolateral walls. LVEF 48%.\par  [de-identified] : \par 10/10/16 Cherie-op MINESH (at [facility]): Normal LV size and systolic function, LVEF 60%. Mild conc LVH. Min MR. Thoracic aorta atheroma (Lewis) grade II (severe intimal thickening).\par \par 8/30/16 Echo: Possible mild apical hypokinesis. LVEF 66%. Mild conc LVH. AV sclerosis. Mild MR/TR.\par  [de-identified] :   \par 9/14/16 Cardiac cath:\par 30% LM\par 40% prox LAD, 95% mid LAD\par 95% D1 (small vessel), 99% D2 (small vessel)\par 90% LAD septal \par 99% distal Cfx\par 70% OM1 (FFR = 0.82) with severe stenosis of smaller inferior branch\par 90% prox RCA, 95% mid RCA (severe diffuse disease), 70% distal RCA\par 95% ostial rPDA\par LVEF 44%, diaphragmatic & posterior basal akinesis

## 2022-07-07 LAB
ALBUMIN SERPL ELPH-MCNC: 4.3 G/DL
ALP BLD-CCNC: 165 U/L
ALT SERPL-CCNC: 31 U/L
ANION GAP SERPL CALC-SCNC: 16 MMOL/L
AST SERPL-CCNC: 18 U/L
BASOPHILS # BLD AUTO: 0.04 K/UL
BASOPHILS NFR BLD AUTO: 0.4 %
BILIRUB SERPL-MCNC: 0.4 MG/DL
BUN SERPL-MCNC: 83 MG/DL
CALCIUM SERPL-MCNC: 9.3 MG/DL
CHLORIDE SERPL-SCNC: 98 MMOL/L
CHOLEST SERPL-MCNC: 111 MG/DL
CO2 SERPL-SCNC: 20 MMOL/L
CREAT SERPL-MCNC: 4.8 MG/DL
EGFR: 12 ML/MIN/1.73M2
EOSINOPHIL # BLD AUTO: 0.07 K/UL
EOSINOPHIL NFR BLD AUTO: 0.8 %
GLUCOSE SERPL-MCNC: 239 MG/DL
HCT VFR BLD CALC: 44.7 %
HDLC SERPL-MCNC: 24 MG/DL
HGB BLD-MCNC: 15.2 G/DL
IMM GRANULOCYTES NFR BLD AUTO: 0.4 %
LDLC SERPL CALC-MCNC: 44 MG/DL
LYMPHOCYTES # BLD AUTO: 1.85 K/UL
LYMPHOCYTES NFR BLD AUTO: 20 %
MAN DIFF?: NORMAL
MCHC RBC-ENTMCNC: 29.6 PG
MCHC RBC-ENTMCNC: 34 GM/DL
MCV RBC AUTO: 87 FL
MONOCYTES # BLD AUTO: 0.64 K/UL
MONOCYTES NFR BLD AUTO: 6.9 %
NEUTROPHILS # BLD AUTO: 6.62 K/UL
NEUTROPHILS NFR BLD AUTO: 71.5 %
NONHDLC SERPL-MCNC: 87 MG/DL
PLATELET # BLD AUTO: 218 K/UL
POTASSIUM SERPL-SCNC: 4.4 MMOL/L
PROT SERPL-MCNC: 6.9 G/DL
RBC # BLD: 5.14 M/UL
RBC # FLD: 13.1 %
SODIUM SERPL-SCNC: 134 MMOL/L
TRIGL SERPL-MCNC: 216 MG/DL
WBC # FLD AUTO: 9.26 K/UL

## 2022-07-12 ENCOUNTER — APPOINTMENT (OUTPATIENT)
Dept: CARDIOLOGY | Facility: CLINIC | Age: 77
End: 2022-07-12

## 2022-07-12 PROCEDURE — 93306 TTE W/DOPPLER COMPLETE: CPT

## 2022-07-13 ENCOUNTER — NON-APPOINTMENT (OUTPATIENT)
Age: 77
End: 2022-07-13

## 2022-07-28 ENCOUNTER — APPOINTMENT (OUTPATIENT)
Dept: NEUROLOGY | Facility: CLINIC | Age: 77
End: 2022-07-28

## 2022-07-28 VITALS
HEIGHT: 66 IN | SYSTOLIC BLOOD PRESSURE: 145 MMHG | DIASTOLIC BLOOD PRESSURE: 78 MMHG | TEMPERATURE: 98.7 F | OXYGEN SATURATION: 99 % | HEART RATE: 55 BPM | BODY MASS INDEX: 30.86 KG/M2 | WEIGHT: 192 LBS

## 2022-07-28 DIAGNOSIS — R26.89 OTHER ABNORMALITIES OF GAIT AND MOBILITY: ICD-10-CM

## 2022-07-28 PROCEDURE — 99205 OFFICE O/P NEW HI 60 MIN: CPT

## 2022-07-29 ENCOUNTER — NON-APPOINTMENT (OUTPATIENT)
Age: 77
End: 2022-07-29

## 2022-08-02 ENCOUNTER — APPOINTMENT (OUTPATIENT)
Dept: GASTROENTEROLOGY | Facility: CLINIC | Age: 77
End: 2022-08-02

## 2022-08-02 VITALS
HEART RATE: 54 BPM | TEMPERATURE: 97.9 F | DIASTOLIC BLOOD PRESSURE: 70 MMHG | BODY MASS INDEX: 30.86 KG/M2 | WEIGHT: 192 LBS | SYSTOLIC BLOOD PRESSURE: 130 MMHG | HEIGHT: 66 IN | OXYGEN SATURATION: 98 %

## 2022-08-02 DIAGNOSIS — Z12.11 ENCOUNTER FOR SCREENING FOR MALIGNANT NEOPLASM OF COLON: ICD-10-CM

## 2022-08-02 PROCEDURE — 99203 OFFICE O/P NEW LOW 30 MIN: CPT

## 2022-08-02 NOTE — ASSESSMENT
[FreeTextEntry1] : At this time, given his multiple comorbidities and dementia with slow decline, a colonoscopy for screening purposes would not be appropriate. I would not recommend a screening colonoscopy at this time as risks outweigh benefits.\par \par If he developed symptoms requiring a colonoscopy (no long screening, but rather diagnostic), I would then plan on performing a colonoscopy.\par \par Thank you for referring Mr. VILLASEÑOR.  Please do not hesitate to call to further discuss his/her care.\par \par Note faxed to requesting MD.\par \par

## 2022-08-02 NOTE — PHYSICAL EXAM
[General Appearance - Alert] : alert [General Appearance - In No Acute Distress] : in no acute distress [Sclera] : the sclera and conjunctiva were normal [Outer Ear] : the ears and nose were normal in appearance [Neck Appearance] : the appearance of the neck was normal [] : no respiratory distress [Apical Impulse] : the apical impulse was normal [Abdomen Soft] : soft [Abnormal Walk] : normal gait [Abdomen Tenderness] : non-tender [Skin Color & Pigmentation] : normal skin color and pigmentation [Cranial Nerves] : cranial nerves 2-12 were intact [Oriented To Time, Place, And Person] : oriented to person, place, and time

## 2022-08-02 NOTE — HISTORY OF PRESENT ILLNESS
[FreeTextEntry1] : Mr. Robins is a pleasant 77M h/o HTN, DM, PAD, HLD, CKD (recent creatinine 4.8 on 7/6/22), CAD s/p CABG 10/16 (recent EF 69% on 7/12/22), Alzheimer's dementia who is referred by Dr. Garcia for colon cancer screening.\par \par Last colonoscopy was 8-9 years ago.  Normal brown BM daily.\par \par He has been becoming increasingly forgetful, established with neurology several days ago, thought to be dementia due to Alzheimer's vs. vascular.  On namenda and aricept.\par \par Does not smoke or drink.\par \par No FHx of any GI malignancies.\par \par Weight stable.\par \par No abd pain.

## 2022-08-08 ENCOUNTER — LABORATORY RESULT (OUTPATIENT)
Age: 77
End: 2022-08-08

## 2022-08-09 LAB — M PROTEIN SPEC IFE-MCNC: NORMAL

## 2022-08-10 ENCOUNTER — APPOINTMENT (OUTPATIENT)
Dept: PAIN MANAGEMENT | Facility: CLINIC | Age: 77
End: 2022-08-10

## 2022-08-10 ENCOUNTER — NON-APPOINTMENT (OUTPATIENT)
Age: 77
End: 2022-08-10

## 2022-08-10 VITALS
WEIGHT: 192 LBS | TEMPERATURE: 97 F | SYSTOLIC BLOOD PRESSURE: 169 MMHG | HEIGHT: 66 IN | DIASTOLIC BLOOD PRESSURE: 81 MMHG | BODY MASS INDEX: 30.86 KG/M2

## 2022-08-10 DIAGNOSIS — G89.4 CHRONIC PAIN SYNDROME: ICD-10-CM

## 2022-08-10 DIAGNOSIS — M79.10 MYALGIA, UNSPECIFIED SITE: ICD-10-CM

## 2022-08-10 DIAGNOSIS — M79.18 MYALGIA, OTHER SITE: ICD-10-CM

## 2022-08-10 PROCEDURE — 99204 OFFICE O/P NEW MOD 45 MIN: CPT

## 2022-08-10 RX ORDER — HUMAN INSULIN 100 [IU]/ML
100 INJECTION, SOLUTION SUBCUTANEOUS
Qty: 27 | Refills: 0 | Status: ACTIVE | COMMUNITY
Start: 2022-08-04

## 2022-08-10 RX ORDER — BLOOD SUGAR DIAGNOSTIC
STRIP MISCELLANEOUS
Qty: 200 | Refills: 0 | Status: ACTIVE | COMMUNITY
Start: 2021-08-18

## 2022-08-10 NOTE — REASON FOR VISIT
[Initial Visit] : an initial pain management visit [FreeTextEntry2] : Referred by Marlene Peterson for back pain

## 2022-08-10 NOTE — HISTORY OF PRESENT ILLNESS
[7] : 3. What number best describes how, during the past week, pain has interfered with your general activity? 7/10 pain [Back Pain] : back pain [___ yrs] : [unfilled] year(s) ago [Constant] : constant [8] : a minimum pain level of 8/10 [10] : a maximum pain level of 10/10 [Sharp] : sharp [Walking] : walking [Rest] : rest [FreeTextEntry1] : HPI\par \par  \par \par Mr. REUBEN VILLASEÑOR is a 77 year M with pmhx of CAD, PAD, DM2, CABG, newly dx dementia, CKD on plavix followed by Dr. Aj Presents with lower back back pain right worse that left. Pain and tingling laterally to right lower extremity that runs from his knee down and toes.  Difficult to walk. Pending lumbar MRI on August 16. Denies any additional weakness, numbness, bowel/bladder dysfunction.\par \par \par \par Previous and current pain medications/doses/effects:\par \par tylenol\par lyrica\par \par Previous Pain Treatments:\par \par  home exercises\par \par Previous Pain Injections:\par \par  na\par \par Previous Diagnostic Studies/Images:\par \par  pending MRI LS \par  [FreeTextEntry2] : 21 [FreeTextEntry7] : lower back pain  [de-identified] : stabbing pain

## 2022-08-10 NOTE — CONSULT LETTER
[Please see my note below.] : Please see my note below. [Consult Closing:] : Thank you very much for allowing me to participate in the care of this patient.  If you have any questions, please do not hesitate to contact me. [Sincerely,] : Sincerely, [Dear  ___] : Dear ~EDIL, [FreeTextEntry3] : \par Sharron Singleton DO, MBA\par Director, Pain Management Center\par  of Anesthesiology\par [facility] [employer] [facility]\par \par \par

## 2022-08-10 NOTE — ASSESSMENT
[FreeTextEntry1] :  >> Imaging and Other Studies\par \par  back and leg pain likely secondary to lumbar radiculopathy and discogenic pain refractory to conservative treatments including 6 consecutive weeks of home exercises/PT, will obtain MRI LS to evaluate for pathology\par \par  \par \par may consider PT vs intervention pending eval\par \par \par >> Therapy and Other Modalities\par \par  continue home exercises\par \par \par >> Medications\par \par  lyrica\par \par \par >> Interventions\par \par  may consider intervention, will likely necessitate holding plavix\par \par >> Consults\par \par  continue care neurology\par \par >> Discussion of Risks/Benefits/Alternatives\par \par  \par \par                 >Regarding any scheduled procedures:\par \par  \par \par I have discussed in detail with the patient that any interventional pain procedure is associated with potential risks.  The procedure may include an injection of steroids and potentially other medications (local anesthetic and normal saline) into the epidural space or surrounding tissue of the spine.  There are significant risks of this procedure which include and are not limited to infection, bleeding, worsening pain, dural puncture leading to postdural puncture headache, nerve damage, spinal cord injury, paralysis, stroke, and death. \par \par  \par \par There is a chance that the procedure does not improve their pain. \par \par  \par \par There are risks associated with the steroid being absorbed into the body systemically.  These include dysphoria, difficulty sleeping, mood swings and personality changes.  Premenopausal women may notice an irregularity in her menstrual cycle for 2-3 months following the injection.  Steroids can specifically affect patients with hypertension, diabetes, and peptic ulcers.  The procedure may cause a temporary increase in blood pressure and blood pressure, and may adversely affect a peptic ulcer.  Other, more rare complications, include avascular necrosis of joints, glaucoma and worsening of osteoporosis.\par \par  \par \par I have discussed the risks of the procedure at length with the patient, and the potential benefits of pain relief.  I have offered alternatives to the procedure.  All questions were answered. \par \par  \par \par The patient expressed understanding and wishes to proceed with the procedure.\par \par  \par \par                 >Regarding COVID19 Pandemic:\par \par  \par \par Any planned interventional pain procedure are scheduled because further delay may cause harm or negative outcome to patient.  The goal in performing this procedure is to avoid deterioration of function, emergency room visits (which increases exposure) and reliance on opioids. \par \par  \par \par r/b/a discussed with patient, lack of evidence to conclusively determine whether pain management procedures have any positive or negative impact on the possibility of marjorie the virus and/or development of any sequelae.\par \par  \par \par Patient counselled regarding timing steroid based intervention 2 weeks before or after COVID-19 vaccine administration to avoid any interaction or affect on efficacy of vaccination\par \par  \par \par Patient demonstrates understanding\par \par  \par \par Informed patient that risks associated with the COVID-19 infection.  Informed patient steps taken to limit the risks.  We are implementing safety precautions and following protocols consistent with the CDC and state recommendations. All patients and staff will be checked for fever or signs of illness upon entry to the facility. We will limit our steroid dose to the lowest effective therapeutic dose or in some cases steroids will not be injected at all.\par \par  \par \par Patient agrees to proceed\par \par  \par \par >> Conclusion\par \par  \par \par The above diagnosis and treatment plan is medically reasonable and necessary based on the patient encounter\par \par There were no barriers to communication.\par \par Informed patient that I would be available for any additional questions.\par \par Patient was instructed to call with any worsening symptoms including severe pain, new numbness/weakness, or changes in the bowel/bladder function.\par \par Discussed role of nsaids in pain management and all relevant risks, if patient is continuing to require after 4 weeks the patient should f/u for alternative treatment.\par \par Instructed patient to maintain pain diary to monitor pain level, mobility, and function.\par \par  \par \par The referring provider was informed of the above diagnosis and treatment plan.\par \par  \par \par

## 2022-08-10 NOTE — PHYSICAL EXAM
[Facet Tenderness] : facet tenderness [Spine: Flexion to ___ degrees, without pain] : spine: flexion to [unfilled] degrees, without pain [SLR] : positive straight leg raise [] : Motor: [NL] : normal and symmetric bilaterally [Normal] : Normal affect [de-identified] : Constitutional: Normal, well developed, no acute distress\par \par Eyes: Symmetric, External structures\par \par Oropharynx: Lips normal, symmetric, no external lesions appreciated\par \par Respiratory: Non-labored breathing, no audible wheezes\par \par Cardiac: Pulse palpated, no tachycardia\par \par Vascular: No cyanosis appreciated, no edema in bilateral lower extremities\par \par GI: Nondistended, no jaundice appreciated\par \par Neurovascular: CN2-12 grossly intact, Alert and oriented\par \par MSK: Normal muscle bulk, 5/5 Motor strength B/L in LE\par \par

## 2022-08-16 ENCOUNTER — RESULT REVIEW (OUTPATIENT)
Age: 77
End: 2022-08-16

## 2022-08-16 LAB
ALBUMIN MFR SERPL ELPH: 55.6 %
ALBUMIN SERPL-MCNC: 3.9 G/DL
ALBUMIN/GLOB SERPL: 1.3 RATIO
ALBUPE: 70 %
ALPHA1 GLOB MFR SERPL ELPH: 5.1 %
ALPHA1 GLOB SERPL ELPH-MCNC: 0.4 G/DL
ALPHA1UPE: 8.3 %
ALPHA2 GLOB MFR SERPL ELPH: 16 %
ALPHA2 GLOB SERPL ELPH-MCNC: 1.1 G/DL
ALPHA2UPE: 5 %
B-GLOBULIN MFR SERPL ELPH: 11.5 %
B-GLOBULIN SERPL ELPH-MCNC: 0.8 G/DL
BETAUPE: 7.9 %
CREAT 24H UR-MCNC: NORMAL G/24 H
CREATININE UR (MAYO): 72 MG/DL
ESTIMATED AVERAGE GLUCOSE: 206 MG/DL
GAMMA GLOB FLD ELPH-MCNC: 0.8 G/DL
GAMMA GLOB MFR SERPL ELPH: 11.8 %
GAMMAUPE: 8.8 %
HBA1C MFR BLD HPLC: 8.8 %
IGA 24H UR QL IFE: NORMAL
INTERPRETATION SERPL IEP-IMP: NORMAL
KAPPA LC 24H UR QL: NORMAL
PROT PATTERN 24H UR ELPH-IMP: NORMAL
PROT SERPL-MCNC: 7 G/DL
PROT SERPL-MCNC: 7 G/DL
PROT UR-MCNC: 381 MG/DL
PROT UR-MCNC: 381 MG/DL
SPECIMEN VOL 24H UR: NORMAL ML
TSH SERPL-ACNC: 4.44 UIU/ML
VIT B1 SERPL-MCNC: 134.2 NMOL/L
VIT B12 SERPL-MCNC: 1247 PG/ML
VIT B6 SERPL-MCNC: 12.4 UG/L

## 2022-08-17 ENCOUNTER — NON-APPOINTMENT (OUTPATIENT)
Age: 77
End: 2022-08-17

## 2022-08-17 DIAGNOSIS — R93.89 ABNORMAL FINDINGS ON DIAGNOSTIC IMAGING OF OTHER SPECIFIED BODY STRUCTURES: ICD-10-CM

## 2022-08-22 NOTE — ASSESSMENT
[FreeTextEntry1] : Clinton Robins is a 77 year old man with several issues.\par \par Dementia-probable Alzheimer's disease or mix with vascular dementia.\par Continue Namenda 10mg\par Continue Aricept 5mg\par Will not adjust dose at this time due to renal function.\par \par Probable right lumbosacral radiculopathy.\par MRI lumbar spine to also evaluate for lumbar stenosis as contributing factor given neurogenic claudication. \par PT referral for gait and balance and fall prevention \par Pain management referral\par \par Peripheral neuropathy likely secondary to diabetes and renal disease.\par Serological workup to look for any other identifiable causes.   \par He was previously on neuropathic pain medication (pt unsure of which ones) in past that caused renal issues.\par Can consider lidocaine cream to areas as needed. \par EMG/NCV legs\par \par Report of Abnormal MRI brain last year in [place]\par Repeat MRI Brain.\par \par Pt and wife encouraged to continue with follow up with nephrology given renal function. \par \par Follow up in 6 weeks with NP after imaging completed and after EMG with Dr. Patel.

## 2022-08-22 NOTE — REASON FOR VISIT
[Consultation] : a consultation visit [Interpreters_IDNumber] : [id number] [Interpreters_FullName] : Yoel [TWNoteComboBox1] : Malagasy

## 2022-08-22 NOTE — HISTORY OF PRESENT ILLNESS
[FreeTextEntry1] : History and visit done using certified .\par \par Clinton Robins is a 77 year old man with a history of CAD, PAD, type 2 DM, HTN, HLD, CKD, CABG, and recently immigrated from [place] presenting for a consultation.\par \par His wife reports he has neurological issues and had an MRI done in [place] in December 2021. He denies history of stroke.\par \par He reports burning pain below the knee in both lower extremities right greater than left. He also has low back pain that radiates down the right leg. Walking makes the pain worse. He reports when he is at rest he has no pain. Denies bowel or bladder difficulties. He reports the burning pain has been for over 5 years and has been stable. He was on medication in [place] and that worsened his renal function and was discontinued. Mr. Robins reports the pain is currently tolerable. He denies numbness. He reports he had several years of heavy alcohol use.\par \par Denies falls or balance difficulties. He walks with a cane and reports pain is improved when he bends forward.\par Has not had an MRI lumbar spine done or PT.\par \par Mr. Robins lives with his wife and she reports he has had a slow, progressive decline in his memory for over two years. He is currently on Memantine and Aricept 5mg daily. Denies side effects. His wife prepares his meals, organizes his medications, and pays the bills for over two years. He denies hallucinations. \par \par current medications:\par memantine 10mg 1 tablet daily\par donepezil 5mg\par jardiance 10mg\par [id number]\par atorvastatin 40mg\par plavix 75mg\par nifedipine 30mg\par clonidine 0.1mg\par bisoprolol 10mg\par torsemide 20mg\par lasix 40mg\par Humalog\par \par Denies smoking. \par

## 2022-08-24 ENCOUNTER — RESULT REVIEW (OUTPATIENT)
Age: 77
End: 2022-08-24

## 2022-08-24 ENCOUNTER — APPOINTMENT (OUTPATIENT)
Dept: NEPHROLOGY | Facility: CLINIC | Age: 77
End: 2022-08-24

## 2022-08-24 VITALS
BODY MASS INDEX: 28.77 KG/M2 | OXYGEN SATURATION: 99 % | WEIGHT: 179 LBS | HEIGHT: 66 IN | HEART RATE: 57 BPM | TEMPERATURE: 95.5 F | DIASTOLIC BLOOD PRESSURE: 64 MMHG | SYSTOLIC BLOOD PRESSURE: 120 MMHG

## 2022-08-24 PROCEDURE — 99215 OFFICE O/P EST HI 40 MIN: CPT

## 2022-08-24 RX ORDER — EMPAGLIFLOZIN 10 MG/1
10 TABLET, FILM COATED ORAL
Qty: 90 | Refills: 0 | Status: DISCONTINUED | COMMUNITY
Start: 2022-07-06 | End: 2022-08-24

## 2022-08-24 RX ORDER — NIFEDIPINE 30 MG/1
30 TABLET, FILM COATED, EXTENDED RELEASE ORAL
Qty: 90 | Refills: 0 | Status: DISCONTINUED | COMMUNITY
Start: 2022-07-05 | End: 2022-08-24

## 2022-08-24 NOTE — PHYSICAL EXAM
[General Appearance - Alert] : alert [General Appearance - In No Acute Distress] : in no acute distress [Sclera] : the sclera and conjunctiva were normal [Extraocular Movements] : extraocular movements were intact [Outer Ear] : the ears and nose were normal in appearance [Hearing Threshold Finger Rub Not [place]] : hearing was normal [Neck Appearance] : the appearance of the neck was normal [Neck Cervical Mass (___cm)] : no neck mass was observed [] : no respiratory distress [Exaggerated Use Of Accessory Muscles For Inspiration] : no accessory muscle use [Apical Impulse] : the apical impulse was normal [Heart Sounds] : normal S1 and S2 [Edema] : there was no peripheral edema [Veins - Varicosity Changes] : there were no varicosital changes [Bowel Sounds] : normal bowel sounds [Abdomen Tenderness] : non-tender [No CVA Tenderness] : no ~M costovertebral angle tenderness [No Spinal Tenderness] : no spinal tenderness [Abnormal Walk] : normal gait [Nail Clubbing] : no clubbing  or cyanosis of the fingernails [Oriented To Time, Place, And Person] : oriented to person, place, and time [Impaired Insight] : insight and judgment were intact

## 2022-08-24 NOTE — ASSESSMENT
[FreeTextEntry1] : CKD 5\par - CKD 4 as far back as 10/2020 when admitted to hosp in [place]\par - cr 2.96 (10/2020)--> 2.2 in 12/2020\par - cr 2.4 5/2021 GFR 25\par - cr 4.8 7/2022 GFR 12\par - feels well, no complaints, no nausea no vomiting, no edema\par - provided dialysis orientation ( PD vs HD)\par - meds reviewed, is on SGLT2, as GFR<30 rec d/c\par - refer to vascular for AV access\par \par HTN\par - under treatment, at goal\par \par \par BPH\par - nocturia\par - catheter in past\par - referred to Urology in past, advised he f/u\par \par \par - checked  BMET\par - checked Uptn/cr\par - checked US\par - referred tor Urologist\par - counseled on avoidance of NSAIDs

## 2022-08-24 NOTE — HISTORY OF PRESENT ILLNESS
[FreeTextEntry1] : 77 yo male with longstanding DM ( > 20yrs), HTN, high cholesterol, CAD ( s/p CABG 10/2016) referred by Dr. Garcia in 4/2021 for elevated cr\par  - per wife was  seen by MD in [place],  and cr was 2.96 10/5/2020 - was hospitalize there for dehydration - upon d/c , returned [place] and was seen by PCP Dr. Garcia 12/2020 2.2. GFR 28 \par - when seen in 5/20221 cr 2.4 GFR 25\par - 7/2022 cr 4.8 GFR 12\par \par ECHO report reviewed from [facility] - EF 65%\par \par Pt here today accompanied by wife\par - feels well, no complaints, no nausea no vomiting, no edema\par - provided dialysis orientation ( PD vs HD)\par - meds reviewed, is on SGLT2, as GFR<30 rec d/c\par \par \par

## 2022-08-26 ENCOUNTER — TRANSCRIPTION ENCOUNTER (OUTPATIENT)
Age: 77
End: 2022-08-26

## 2022-08-30 ENCOUNTER — RESULT REVIEW (OUTPATIENT)
Age: 77
End: 2022-08-30

## 2022-08-31 ENCOUNTER — APPOINTMENT (OUTPATIENT)
Dept: VASCULAR SURGERY | Facility: HOSPITAL | Age: 77
End: 2022-08-31

## 2022-09-02 ENCOUNTER — TRANSCRIPTION ENCOUNTER (OUTPATIENT)
Age: 77
End: 2022-09-02

## 2022-09-14 ENCOUNTER — APPOINTMENT (OUTPATIENT)
Dept: VASCULAR SURGERY | Facility: CLINIC | Age: 77
End: 2022-09-14

## 2022-09-14 VITALS — DIASTOLIC BLOOD PRESSURE: 74 MMHG | HEART RATE: 61 BPM | SYSTOLIC BLOOD PRESSURE: 129 MMHG

## 2022-09-14 DIAGNOSIS — I12.0 OTHER SPECIFIED DIABETES MELLITUS WITH DIABETIC CHRONIC KIDNEY DISEASE: ICD-10-CM

## 2022-09-14 DIAGNOSIS — E13.22 OTHER SPECIFIED DIABETES MELLITUS WITH DIABETIC CHRONIC KIDNEY DISEASE: ICD-10-CM

## 2022-09-14 DIAGNOSIS — N18.5 OTHER SPECIFIED DIABETES MELLITUS WITH DIABETIC CHRONIC KIDNEY DISEASE: ICD-10-CM

## 2022-09-14 PROCEDURE — 99024 POSTOP FOLLOW-UP VISIT: CPT

## 2022-09-14 NOTE — REVIEW OF SYSTEMS
[Fever] : no fever [Chills] : no chills [Lower Ext Edema] : lower extremity edema [Shortness Of Breath] : shortness of breath

## 2022-09-14 NOTE — REASON FOR VISIT
[de-identified] : s/p left brachial cephalic AVF [de-identified] : 76 yo male returns in follow up. He is s/p a left brachial cephalic AVF. His wife reports that he appears more tired and has worsening edema. He reports that he developed left arm edema post procedure which has now improved. He denies pain or numbness in his left hand.

## 2022-09-14 NOTE — PHYSICAL EXAM
[2+] : left 2+ [Ankle Swelling (On Exam)] : present [Ankle Swelling Bilaterally] : bilaterally  [Ankle Swelling On The Left] : moderate [Alert] : alert [Oriented to Person] : oriented to person [Oriented to Place] : oriented to place [Oriented to Time] : oriented to time [de-identified] : Awake and Alert, ill appearing [FreeTextEntry1] : palpable thrill in AVF [de-identified] : Incision healing appropriately, mild edema of the arm [de-identified] : No gross motor or sensory deficits left hand [de-identified] : Appropriate

## 2022-09-14 NOTE — DISCUSSION/SUMMARY
[FreeTextEntry1] : 77-year-old male with chronic kidney disease at age 6.  He is status post creation of a left brachiocephalic AV fistula.  The fistula was widely patent on exam.  He has no gross motor or sensory deficits of his hand.  His wife reports that he is increasingly tired and edematous.  I discussed this with Dr. Carrasco who will attempt to see him in the next week.  If his symptoms worsen he was instructed to go to the ED.  He will follow-up in 1 month for an HD access ultrasound.

## 2022-09-21 ENCOUNTER — APPOINTMENT (OUTPATIENT)
Dept: NEUROLOGY | Facility: CLINIC | Age: 77
End: 2022-09-21

## 2022-09-21 VITALS
TEMPERATURE: 98.7 F | OXYGEN SATURATION: 98 % | DIASTOLIC BLOOD PRESSURE: 84 MMHG | SYSTOLIC BLOOD PRESSURE: 185 MMHG | BODY MASS INDEX: 29.57 KG/M2 | HEART RATE: 60 BPM | WEIGHT: 184 LBS | HEIGHT: 66 IN

## 2022-09-21 DIAGNOSIS — R41.3 OTHER AMNESIA: ICD-10-CM

## 2022-09-21 DIAGNOSIS — M54.50 LOW BACK PAIN, UNSPECIFIED: ICD-10-CM

## 2022-09-21 DIAGNOSIS — M79.604 LOW BACK PAIN, UNSPECIFIED: ICD-10-CM

## 2022-09-21 DIAGNOSIS — M48.061 SPINAL STENOSIS, LUMBAR REGION WITHOUT NEUROGENIC CLAUDICATION: ICD-10-CM

## 2022-09-21 PROCEDURE — 99214 OFFICE O/P EST MOD 30 MIN: CPT

## 2022-09-21 NOTE — HISTORY OF PRESENT ILLNESS
[FreeTextEntry1] : History and visit done using certified  Chava [id number]\par \par Clinton Robins is a 77 year old man with a history of CAD, CKD, HTN, DM, CABG, neuropathy, HLD presenting for a follow up appointment.\par \par He was recently hospitalized for GUSTAVO on CKD 8/24-8/26 with save the vein in the left antecubital fossa for future dialysis access placement. \par His wife is unsure which medications he is currently taking and does not have a current medication list with her. Wife instructed to call office with updated medication list and bring the list to all doctors appointments to ensure he is taking the correct medication. He has a follow up with nephrology scheduled. \par \par He reports ongoing gait difficulties and walks with a cane. He feels his gait is limited due to pain and fatigue in his legs. Denies falls or injuries. Denies bowel or bladder difficulties. Rare incontinence. Denies burning on urination or hematuria.  He did not see Dr. Coleman and do physical therapy as previously advised. MRA head and neck not completed. His wife reports his memory is stable. Neuropsychological evaluation previously ordered. \par \par The remaining neurological review of systems is negative. \par \par 7/28/22\par History and visit done using certified .\par \par Clinton Robins is a 77 year old man with a history of CAD, PAD, type 2 DM, HTN, HLD, CKD, CABG, and recently immigrated from [place] presenting for a consultation.\par \par His wife reports he has neurological issues and had an MRI done in [place] in December 2021. He denies history of stroke.\par \par He reports burning pain below the knee in both lower extremities right greater than left. He also has low back pain that radiates down the right leg. Walking makes the pain worse. He reports when he is at rest he has no pain. Denies bowel or bladder difficulties. He reports the burning pain has been for over 5 years and has been stable. He was on medication in [place] and that worsened his renal function and was discontinued. Mr. Robins reports the pain is currently tolerable. He denies numbness. He reports he had several years of heavy alcohol use.\par \par Denies falls or balance difficulties. He walks with a cane and reports pain is improved when he bends forward.\par Has not had an MRI lumbar spine done or PT.\par \par Mr. Robins lives with his wife and she reports he has had a slow, progressive decline in his memory for over two years. He is currently on Memantine and Aricept 5mg daily. Denies side effects. His wife prepares his meals, organizes his medications, and pays the bills for over two years. He denies hallucinations. \par \par current medications:\par memantine 10mg 1 tablet daily\par donepezil 5mg\par jardiance 10mg\par [id number]\par atorvastatin 40mg\par plavix 75mg\par nifedipine 30mg\par clonidine 0.1mg\par bisoprolol 10mg\par torsemide 20mg\par lasix 40mg\par Humalog\par \par Denies smoking. \par

## 2022-09-21 NOTE — REASON FOR VISIT
[Follow-Up: _____] : a [unfilled] follow-up visit [FreeTextEntry1] : leg pain  [Interpreters_IDNumber] : [id number] [Interpreters_FullName] : Khoi [TWNoteComboBox1] : Malian

## 2022-09-21 NOTE — PHYSICAL EXAM
[FreeTextEntry1] : Physical examination \par General: No acute distress, Awake, Alert.   \par other: positive right straight leg raise\par \par Mental status \par Awake, alert, and oriented to person, NOT time (year 2022 unsure of Month) and place, Normal attention span and concentration, Recent and remote memory NOT intact, Language intact, Fund of knowledge NOT intact.\par Delayed recall 0/3\par    \par Cranial Nerves \par II: VFF  \par III, IV, VI: PERRL, EOMI.   \par V: Facial sensation is normal B/L.   \par VII: Facial strength is normal B/L. \par \par \par VIII: Gross hearing is intact.   \par  \par IX, X: Palate is midline and elevates symmetrically.   \par XI: Trapezius normal strength.   \par XII: Tongue midline without atrophy or fasciculations. \par \par Motor exam  \par Muscle tone - no evidence of rigidity or resistance in all 4 extremities.  \par No atrophy or fasciculations \par Muscle Strength: arms and legs, proximal and distal flexors and extensors are normal \par \par No UE drift.\par \par Reflexes \par Arms 1\par knees 2 \par right ankle 0 \par Plantars right: mute.   \par Plantars left: mute.   \par     \par \par Coordination \par Finger to nose: Normal.  \par Heel to shin: Normal.   \par  \par \par Sensory \par  Decreased PP up to knee to lower extremities..\par Cold variable.\par Decreased vibration great toe B. \par \par Gait  \par uses cane \par Slow, wide based gait. \par no magnetic gait noted during exam \par

## 2022-09-21 NOTE — ASSESSMENT
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 Not a clear magnetic gait on exam, and dementia. Referral to Dr. Coleman for further evaluation.\par Had a previous MRI Brain last year in [place] -images unavailable. \par Right vertebral artery unable to be viewed on MRI Brain- MRA head and neck without contrast reordered.\par \par Probable right lumbosacral radiculopathy and foraminal stenosis.\par MRI lumbar spine with moderate to severe foraminal stenosis as contributing factor given neurogenic claudication. \par PT referral for gait and balance and fall prevention \par Pain management referral\par Referral to Dr. Coleman\par \par Peripheral neuropathy likely secondary to diabetes and renal disease.\par Serological workup to look for any other identifiable causes.   \par He was previously on neuropathic pain medication (pt unsure of which ones) in past that caused renal issues.\par Can consider lidocaine cream to areas as needed. \par EMG/NCV legs\par \par Elevated blood pressure in office- wife instructed to check BP at home and if elevated to call cardiology or PCP. \par \par Recent hospitalization for GUSTAVO on CKD- follow up with nephrology. \par \par Follow up after EMG with Dr. Patel. \par Can alternate follow ups with NP.

## 2022-09-21 NOTE — DATA REVIEWED
[de-identified] : 8/16/22 \par IMPRESSION:\par \par  Ventricles and sulci are moderately disproportionately prominent likely related to subtle degree of\par moderate parenchymal volume loss.  . There are acute callosal angles at the level of posterior\par commissure and sulcal crowding at the vertex which may represent superimposed communicating\par hydrocephalus in appropriate clinical setting.\par \par  Asymmetric loss of flow void of the distal right vertebral artery which may represent underlying\par occlusion/stenosis. Further evaluation with dedicated noncontrast MRA of the head and neck may be\par obtained, as clinically warranted.\par \par  No acute intracranial hemorrhage, acute infarction or extra-axial fluid collection.\par  [de-identified] : 8/22/22 MRI lumbar spine \par  IMPRESSION:\par \par  Multilevel lumbar spondylitic changes as detailed above most notable at the L4-L5 and L5-S1 levels\par where there is moderate to severe bilateral foraminal stenosis involving exiting L4, 5 mm,\par respectively and moderate bilateral lateral recess stenosis involving descending L5, S1 nerve roots,\par respectively. No significant canal stenosis of the lumbar spine.\par \par  No

## 2022-09-22 ENCOUNTER — APPOINTMENT (OUTPATIENT)
Dept: NEPHROLOGY | Facility: CLINIC | Age: 77
End: 2022-09-22

## 2022-09-29 ENCOUNTER — TRANSCRIPTION ENCOUNTER (OUTPATIENT)
Age: 77
End: 2022-09-29

## 2022-09-29 ENCOUNTER — APPOINTMENT (OUTPATIENT)
Dept: NEPHROLOGY | Facility: CLINIC | Age: 77
End: 2022-09-29

## 2022-10-05 ENCOUNTER — APPOINTMENT (OUTPATIENT)
Dept: CARDIOLOGY | Facility: CLINIC | Age: 77
End: 2022-10-05

## 2022-10-05 ENCOUNTER — APPOINTMENT (OUTPATIENT)
Dept: CARE COORDINATION | Facility: HOME HEALTH | Age: 77
End: 2022-10-05

## 2022-10-05 VITALS
OXYGEN SATURATION: 100 % | HEIGHT: 66 IN | SYSTOLIC BLOOD PRESSURE: 182 MMHG | DIASTOLIC BLOOD PRESSURE: 71 MMHG | BODY MASS INDEX: 28.77 KG/M2 | HEART RATE: 77 BPM | RESPIRATION RATE: 12 BRPM | WEIGHT: 179 LBS

## 2022-10-05 VITALS
OXYGEN SATURATION: 99 % | RESPIRATION RATE: 16 BRPM | DIASTOLIC BLOOD PRESSURE: 60 MMHG | HEART RATE: 64 BPM | SYSTOLIC BLOOD PRESSURE: 120 MMHG

## 2022-10-05 DIAGNOSIS — E11.9 TYPE 2 DIABETES MELLITUS W/OUT COMPLICATIONS: ICD-10-CM

## 2022-10-05 DIAGNOSIS — Z86.79 PERSONAL HISTORY OF OTHER DISEASES OF THE CIRCULATORY SYSTEM: ICD-10-CM

## 2022-10-05 PROCEDURE — 99214 OFFICE O/P EST MOD 30 MIN: CPT

## 2022-10-05 PROCEDURE — 99495 TRANSJ CARE MGMT MOD F2F 14D: CPT

## 2022-10-05 NOTE — CARDIOLOGY SUMMARY
[de-identified] : \par 9/29/22 ECG (at [facility] [facility]): Sinus sravani, rate 58 bpm, LAD, incomplete RBBB, inferior infarct, non-specific T wave abnormalities, poor R wave progression\par 7/6/22 ECG: Sinus rhythm, rate 60 bpm, RSR' pattern in V1, inferolateral infarct, nonspecific T wave abnormalities, poor R wave progression\par 3/12/21 ECG: Sinus, rate 63 bpm, RSR' pattern in V1, inferior infarct, poor R wave progression, non-specific T wave abnormalities\par 11/24/20 ECG: Sinus, rate 66 bpm, RSR' pattern in V1, inferior infarct, poor R wave progression\par 7/16/19 ECG: Sinus, rate 78 bpm, RSR' pattern in V1, inferior infarct\par  [de-identified] : \par 9/2/16 Regadenoson sestamibi: Medium sized moderate infarct of basal to mid inferior and inferolateral walls. Moderate hypokinesis of basal to mid inferior and inferolateral walls. LVEF 48%.\par  [de-identified] : \par 9/22/22 Echo (at [facility]): Normal LV systolic function, LVEF 55%. Conc LVH. Normal RV size and systolic function. Dilated LA. MAC and MV thickening. AV sclerosis.\par \par 7/12/22 Echo: Normal LV size and systolic/diastolic function, LVEF 69%. Mild LVH. AV sclerosis. Mild MR/TR.\par \par 10/10/16 Cherie-op MINESH (at [facility]): Normal LV size and systolic function, LVEF 60%. Mild conc LVH. Min MR. Thoracic aorta atheroma (Lewis) grade II (severe intimal thickening).\par  [de-identified] :   \par 9/14/16 Cardiac cath:\par 30% LM\par 40% prox LAD, 95% mid LAD\par 95% D1 (small vessel), 99% D2 (small vessel)\par 90% LAD septal \par 99% distal Cfx\par 70% OM1 (FFR = 0.82) with severe stenosis of smaller inferior branch\par 90% prox RCA, 95% mid RCA (severe diffuse disease), 70% distal RCA\par 95% ostial rPDA\par LVEF 44%, diaphragmatic & posterior basal akinesis

## 2022-10-05 NOTE — PHYSICAL EXAM
[Well Nourished] : well nourished [No Acute Distress] : no acute distress [Normal Conjunctiva] : normal conjunctiva [Normal Venous Pressure] : normal venous pressure [No Carotid Bruit] : no carotid bruit [Clear Lung Fields] : clear lung fields [Good Air Entry] : good air entry [No Respiratory Distress] : no respiratory distress  [No Cyanosis] : no cyanosis [No Clubbing] : no clubbing [Normal Rate] : normal [Rhythm Regular] : regular [Normal S1] : normal S1 [Normal S2] : normal S2 [No Murmur] : no murmurs heard [No Pitting Edema] : no pitting edema present [No Edema] : no edema [Right Carotid Bruit] : no bruit heard over the right carotid [Left Carotid Bruit] : no bruit heard over the left carotid

## 2022-10-05 NOTE — HISTORY OF PRESENT ILLNESS
[FreeTextEntry1] : 76 yo male with HTN, DM type 2, PAD, hyperlipidemia, CKD (recently started on HD on 9/27/22), severe multivessel CAD -> CABG 10/10/16 (LIMA to LAD, LIMA-DWAYNE to OM, SVG to PDA), and recently diagnosed HFpEF. Patient presents today for follow-up following his [place] hospitalization from 9/22/22 - 10/3/22 where he was admitted for new HFpEF and worsening CKD requiring initiation of hemodialysis. Patient denies currently chest pain, dyspnea, palpitations, syncope, edema, melena, hematochezia, or hematemesis. Wife reports that he did not take his medications today, but reported -130s at home. \par \par PMD: David Garcia MD\par Renal: Grant Lunsford MD

## 2022-10-05 NOTE — ASSESSMENT
[FreeTextEntry1] : 76 yo male with HTN, DM type 2, PAD, hyperlipidemia, CKD (recently started on HD on 9/27/22), severe multivessel CAD -> CABG 10/10/16 (LIMA to LAD, LIMA-DWAYNE to OM, SVG to PDA), and recently diagnosed HFpEF.\par Echo on 9/22/22 demonstrated normal LV systolic function, LVEF 55%, conc LVH, dilated LA.\par \par Patient is clinically stable from CAD/HFpEF standpoint.\par Will continue to monitor on clopidogrel, bisoprolol, atorvastatin, and Zetia.\par Will consider repeat nuclear stress test at next follow-up appointment in 1 month for ischemic evaluation given recently diagnosed HFpEF. However, patient's uncontrolled HTN and CKD which progressed to ESRD on HD were major contributing factors. \par \par Blood pressure is uncontrolled, but wife reports that he did not take his medications today and reported -130s at home yesterday.\par Will continue bisoprolol 10 mg po daily, clonidine 0.1 mg po bid, nifedipine ER 60 mg po daily, and hydralazine 25 mg po tid.

## 2022-10-07 DIAGNOSIS — E83.39 OTHER DISORDERS OF PHOSPHORUS METABOLISM: ICD-10-CM

## 2022-10-07 RX ORDER — CALCIUM ACETATE 667 MG/1
667 CAPSULE ORAL 3 TIMES DAILY
Qty: 270 | Refills: 1 | Status: ACTIVE | COMMUNITY
Start: 2022-10-07 | End: 1900-01-01

## 2022-10-13 ENCOUNTER — APPOINTMENT (OUTPATIENT)
Dept: NEPHROLOGY | Facility: CLINIC | Age: 77
End: 2022-10-13

## 2022-10-13 VITALS
SYSTOLIC BLOOD PRESSURE: 120 MMHG | WEIGHT: 167 LBS | OXYGEN SATURATION: 99 % | BODY MASS INDEX: 26.84 KG/M2 | HEART RATE: 59 BPM | DIASTOLIC BLOOD PRESSURE: 60 MMHG | HEIGHT: 66 IN | TEMPERATURE: 96.2 F

## 2022-10-13 DIAGNOSIS — R40.0 SOMNOLENCE: ICD-10-CM

## 2022-10-13 PROCEDURE — 99214 OFFICE O/P EST MOD 30 MIN: CPT

## 2022-10-13 NOTE — PHYSICAL EXAM
[General Appearance - Alert] : alert [General Appearance - Well Nourished] : well nourished [Sclera] : the sclera and conjunctiva were normal [Extraocular Movements] : extraocular movements were intact [Neck Appearance] : the appearance of the neck was normal [] : no respiratory distress [Exaggerated Use Of Accessory Muscles For Inspiration] : no accessory muscle use [Apical Impulse] : the apical impulse was normal [Heart Sounds] : normal S1 and S2 [Edema] : there was no peripheral edema [Veins - Varicosity Changes] : there were no varicosital changes [Bowel Sounds] : normal bowel sounds [Abdomen Soft] : soft [No CVA Tenderness] : no ~M costovertebral angle tenderness [No Spinal Tenderness] : no spinal tenderness [Abnormal Walk] : normal gait [Musculoskeletal - Swelling] : no joint swelling seen [Skin Color & Pigmentation] : normal skin color and pigmentation [Skin Turgor] : normal skin turgor [Cranial Nerves] : cranial nerves 2-12 were intact [No Focal Deficits] : no focal deficits [FreeTextEntry1] : flat affect

## 2022-10-13 NOTE — REVIEW OF SYSTEMS
[Fever] : no fever [Chills] : no chills [Eye Pain] : no eye pain [Red Eyes] : eyes not red [Dry Eyes] : no dryness of the eyes [Earache] : no earache [Nosebleeds] : no nosebleeds [Nasal Discharge] : no nasal discharge [Heart Rate Is Slow] : the heart rate was not slow [Heart Rate Is Fast] : the heart rate was not fast [Shortness Of Breath] : no shortness of breath [Wheezing] : no wheezing [Dysuria] : no dysuria [Incontinence] : no incontinence [Joint Swelling] : no joint swelling [Joint Stiffness] : no joint stiffness [Skin Lesions] : no skin lesions [Skin Wound] : no skin wound [Confused] : confusion [Convulsions] : no convulsions [Suicidal] : not suicidal [Sleep Disturbances] : sleep disturbances [Proptosis] : no proptosis [Muscle Weakness] : no muscle weakness [Easy Bleeding] : no tendency for easy bleeding [Easy Bruising] : no tendency for easy bruising

## 2022-10-13 NOTE — REASON FOR VISIT
[Follow-Up] : a follow-up visit [FreeTextEntry1] : s/p hospitalization during which time he started dialysis

## 2022-10-13 NOTE — ASSESSMENT
[FreeTextEntry1] : - excessive somnolence - quetiapine started in hosp - d/c\par - d/c calcium\par - start neprhocap\par - monitor BP, may npot need as much/doses may need to be adjusted\par - monitor phos, may need binders\par

## 2022-10-13 NOTE — HISTORY OF PRESENT ILLNESS
[FreeTextEntry1] : f/u progression of CKD to ESRD\par AVF placed, not mature\par dialyzed now TuThSa\par frustrated at lack of communication with HD staff\par \par wife reports he is more fatiigued, sleepy - \par todays /60 after taking nifedipine 60, bisoprolol 10, hydralazine 25 tid, clonidine\par

## 2022-10-14 PROBLEM — E11.9 TYPE 2 DIABETES MELLITUS: Status: ACTIVE | Noted: 2018-06-09

## 2022-10-14 RX ORDER — CLOPIDOGREL BISULFATE 75 MG/1
75 TABLET, FILM COATED ORAL
Refills: 0 | Status: COMPLETED | COMMUNITY
End: 2022-10-14

## 2022-10-14 RX ORDER — DONEPEZIL HYDROCHLORIDE 5 MG/1
5 TABLET ORAL
Qty: 30 | Refills: 0 | Status: COMPLETED | COMMUNITY
Start: 2022-07-05 | End: 2022-10-14

## 2022-10-14 NOTE — HISTORY OF PRESENT ILLNESS
[FreeTextEntry1] : Follow-up for discharge from [facility] for CHF\par  [de-identified] : Patient is a 77 year y/o male enrolled in the [facility] program with a history of DM, HTN, ESRD, CHF recently admitted from 9/22/22-10/3/22 to [facility] for CHF.  Hospital record reviewed.  "Pt presented to the ED with worsening SOB x1 day. Patient was placed on BiPAP for increased work of breathing, EKG - NSR. UA showing microscopic hematuria of 5-10 RBCs. CXR significant for cardiomegaly and b/l pulmonary edema with extensive R pleural effusion. D-dimer 350, troponin 49. BUN 51, Cr 4.1 - both elevated but pt has h/o CKD and current GFR is 13, not yet on dialysis. S/p 1x Lasix. Nephrology (Dr. Navarro) and Intensivist consulted, pt admitted to the ICU for further management and to be placed on a nitro drip for his BP (max of 234/105).   Pt was started on a nitro drip with careful consideration of not lowering pt’s BP too fast. Pt was weaned off of BiPap and was started on supplemental O2 with NC as needed and continued on IV Lasix. Echo showing concentric LVH with EF 55% and dilated left atrium. During this time, pt’s HTN was managed with bisoprolol, nifedipine, clonidine, and losartan. Cardiology and Nephrology were consulted to help optimize the pt’s management. Per nephrology, pt has an immature AV fistula - however dialysis was not indicated at this time and pt was continued on sodium bicarbonate to improve the acidosis. Pt was found to have microscopic hematuria with >5 urine RBCs - pt has a history of nocturia and will assess if pt develops s/s of hematuria. Cardiology recommended beginning hydralazine for patient’s blood pressure. Patient’s CKD 4 worsened to ESRD and patient was started on hemodialysis on 9/27/2022. Pt’s chronic medical conditions were managed throughout his stay. All electrolyte abnormalities and any further signs/symptoms were managed appropriately. Pt is now deemed medically stable for discharge . Pt advised to follow up with PCP in 1 week, Nephrology in 1-2 weeks, and to continue hemodialysis outpatient."\par \par Patient contact by TCM RN on 10/4/22 and d/c instructions reviewed.  Discharge medications were reviewed and reconciled with the current medication list and medications in home.  Documentation can be found in clinical viewer.\par \par Patient Japanese speaking: [employer]  ID: [id number]\par \par Patient evaluated in home and on arrival alert and oriented x3, and in no acute distress.  Patient sitting at table, breathing appears comfortable.  Patient states he is doing well, breathing comfortable, and denies shortness of breath.  Patient states he just feels tired.  Patient gets dialysis Tu/Th/Sat.  Patient advised on low sodium renal diet.  Patient states pharmacy didn't have the sevelamer, advised to speak to nephrologist about it.  Patient denies chest pain, palpitations, shortness of breath, abdominal pain, nausea, vomiting, diarrhea, lightheaded or dizziness.\par \par Patient with no other questions or concerns at this time.  Patient given yellow card with contact information on it and advised to call with any questions or concerns.\par \par \par

## 2022-10-14 NOTE — PLAN
[FreeTextEntry1] : - Follow-up with Nephrology, Cardiology, and PCP\par \par - Patient and wife verbalized understanding of plan above.  Advised to call with any other questions or concerns.

## 2022-10-14 NOTE — INTERPRETER SERVICES
[[employer] ] : provided by [employer]   [Interpreters_IDNumber] : [id number] [FreeTextEntry3] : Gabonese

## 2022-10-14 NOTE — PHYSICAL EXAM
[No Acute Distress] : no acute distress [Well Nourished] : well nourished [Well Developed] : well developed [Well-Appearing] : well-appearing [Normal Sclera/Conjunctiva] : normal sclera/conjunctiva [Normal Outer Ear/Nose] : the outer ears and nose were normal in appearance [Supple] : supple [No Respiratory Distress] : no respiratory distress  [No Accessory Muscle Use] : no accessory muscle use [Clear to Auscultation] : lungs were clear to auscultation bilaterally [Normal Rate] : normal rate  [Regular Rhythm] : with a regular rhythm [Normal S1, S2] : normal S1 and S2 [No Edema] : there was no peripheral edema [Soft] : abdomen soft [Non Tender] : non-tender [Non-distended] : non-distended [Normal Bowel Sounds] : normal bowel sounds [No CVA Tenderness] : no CVA  tenderness [No Rash] : no rash [Normal Affect] : the affect was normal [Alert and Oriented x3] : oriented to person, place, and time [Normal Mood] : the mood was normal

## 2022-10-14 NOTE — ASSESSMENT
[FreeTextEntry1] : Patient is a 77 year y/o male enrolled in the [facility] program with a history of DM, HTN, ESRD, CHF recently admitted from 9/22/22-10/3/22 to [facility] for CHF.  Hospital record reviewed.  "Pt presented to the ED with worsening SOB x1 day. Patient was placed on BiPAP for increased work of breathing, EKG - NSR. UA showing microscopic hematuria of 5-10 RBCs. CXR significant for cardiomegaly and b/l pulmonary edema with extensive R pleural effusion. D-dimer 350, troponin 49. BUN 51, Cr 4.1 - both elevated but pt has h/o CKD and current GFR is 13, not yet on dialysis. S/p 1x Lasix. Nephrology (Dr. Navarro) and Intensivist consulted, pt admitted to the ICU for further management and to be placed on a nitro drip for his BP (max of 234/105).   Pt was started on a nitro drip with careful consideration of not lowering pt’s BP too fast. Pt was weaned off of BiPap and was started on supplemental O2 with NC as needed and continued on IV Lasix. Echo showing concentric LVH with EF 55% and dilated left atrium. During this time, pt’s HTN was managed with bisoprolol, nifedipine, clonidine, and losartan. Cardiology and Nephrology were consulted to help optimize the pt’s management. Per nephrology, pt has an immature AV fistula - however dialysis was not indicated at this time and pt was continued on sodium bicarbonate to improve the acidosis. Pt was found to have microscopic hematuria with >5 urine RBCs - pt has a history of nocturia and will assess if pt develops s/s of hematuria. Cardiology recommended beginning hydralazine for patient’s blood pressure. Patient’s CKD 4 worsened to ESRD and patient was started on hemodialysis on 9/27/2022. Pt’s chronic medical conditions were managed throughout his stay. All electrolyte abnormalities and any further signs/symptoms were managed appropriately. Pt is now deemed medically stable for discharge . Pt advised to follow up with PCP in 1 week, Nephrology in 1-2 weeks, and to continue hemodialysis outpatient."\par \par Patient contact by TCM RN on 10/4/22 and d/c instructions reviewed.  Discharge medications were reviewed and reconciled with the current medication list and medications in home.  Documentation can be found in clinical viewer.\par \par Patient Danish speaking: [employer]  ID: [id number]\par \par Patient evaluated in home and on arrival alert and oriented x3, and in no acute distress.  Patient sitting at table, breathing appears comfortable.  Patient states he is doing well, breathing comfortable, and denies shortness of breath.  Patient states he just feels tired.  Patient gets dialysis Tu/Th/Sat.  Patient advised on low sodium renal diet.  Patient states pharmacy didn't have the sevelamer, advised to speak to nephrologist about it.  Patient denies chest pain, palpitations, shortness of breath, abdominal pain, nausea, vomiting, diarrhea, lightheaded or dizziness.\par \par Patient with no other questions or concerns at this time.  Patient given yellow card with contact information on it and advised to call with any questions or concerns.\par

## 2022-10-14 NOTE — REVIEW OF SYSTEMS
[Fatigue] : fatigue [Negative] : Integumentary [Fever] : no fever [Chills] : no chills [Chest Pain] : no chest pain [Palpitations] : no palpitations [Lower Ext Edema] : no lower extremity edema [Shortness Of Breath] : no shortness of breath [Dyspnea on Exertion] : not dyspnea on exertion [Abdominal Pain] : no abdominal pain [Nausea] : no nausea [Constipation] : no constipation [Diarrhea] : no diarrhea [Vomiting] : no vomiting [Headache] : no headache [Dizziness] : no dizziness

## 2022-10-18 ENCOUNTER — APPOINTMENT (OUTPATIENT)
Dept: NEUROLOGY | Facility: CLINIC | Age: 77
End: 2022-10-18

## 2022-10-18 DIAGNOSIS — M54.16 RADICULOPATHY, LUMBAR REGION: ICD-10-CM

## 2022-10-18 DIAGNOSIS — G62.9 POLYNEUROPATHY, UNSPECIFIED: ICD-10-CM

## 2022-10-18 PROCEDURE — 99213 OFFICE O/P EST LOW 20 MIN: CPT | Mod: 25

## 2022-10-18 PROCEDURE — 95886 MUSC TEST DONE W/N TEST COMP: CPT

## 2022-10-18 PROCEDURE — 95911 NRV CNDJ TEST 9-10 STUDIES: CPT

## 2022-10-19 ENCOUNTER — APPOINTMENT (OUTPATIENT)
Dept: VASCULAR SURGERY | Facility: CLINIC | Age: 77
End: 2022-10-19

## 2022-10-19 VITALS — DIASTOLIC BLOOD PRESSURE: 70 MMHG | HEART RATE: 59 BPM | SYSTOLIC BLOOD PRESSURE: 153 MMHG

## 2022-10-19 PROBLEM — G62.9 PERIPHERAL NEUROPATHY: Status: ACTIVE | Noted: 2020-11-24

## 2022-10-19 PROBLEM — M54.16 LUMBAR RADICULOPATHY, CHRONIC: Status: ACTIVE | Noted: 2022-08-10

## 2022-10-19 PROCEDURE — 99024 POSTOP FOLLOW-UP VISIT: CPT

## 2022-10-19 PROCEDURE — 93990 DOPPLER FLOW TESTING: CPT

## 2022-10-19 NOTE — ASSESSMENT
[FreeTextEntry1] : EMG/NCS today shows mild bilateral L5 and left S1 radiculoapthy superimposed on mild polyneuropathy.\par \par EMG REPORT WILL BE UPLOADED SEPARATELY AS A PDF\par

## 2022-10-19 NOTE — HISTORY OF PRESENT ILLNESS
[FreeTextEntry1] : REUBEN VILLASEÑOR is a 77 year who is referred for clinical neurophysiological consultation and EMG/NCS for \par \par referred by NP Marlene Peterson\par \par Burning below the knees right worse than left with back pain radiating to the right leg. Apparently these symptoms have been present and stable for 5-20 years.\par \par \par

## 2022-10-19 NOTE — DATA REVIEWED
[de-identified] : 8/22/22 MRI lumbar spine \par  IMPRESSION:\par \par  Multilevel lumbar spondylitic changes as detailed above most notable at the L4-L5 and L5-S1 levels\par where there is moderate to severe bilateral foraminal stenosis involving exiting L4, 5 mm,\par respectively and moderate bilateral lateral recess stenosis involving descending L5, S1 nerve roots,\par respectively. No significant canal stenosis of the lumbar spine.\par

## 2022-10-19 NOTE — PHYSICAL EXAM
[2+] : left 2+ [Ankle Swelling (On Exam)] : not present [Ankle Swelling Bilaterally] : bilaterally  [Alert] : alert [Oriented to Person] : oriented to person [Oriented to Place] : oriented to place [Oriented to Time] : oriented to time [de-identified] : Awake and Alert, ill appearing [de-identified] : KEYLA PC in place [FreeTextEntry1] : palpable thrill in AVF [de-identified] : Incision healed, no edema, hand warm and pink [de-identified] : No gross motor or sensory deficits left hand [de-identified] : Appropriate

## 2022-10-19 NOTE — DISCUSSION/SUMMARY
[FreeTextEntry1] : HD access us - patent AVF, proximal stenosis. Flow velocities appropriate,  > 6 mm\par \par 77-year-old male with chronic kidney disease at age 6.  He is status post creation of a left brachiocephalic AV fistula.  The fistula is widely patent on exam.  He has no gross motor or sensory deficits of his hand.  He underwent a HD access us which demonstrates an anastomotic stenosis with goof flow volume and a size > 6 mm. I think it is reasonable to continue to observe. Follow up in 3 weeks If the fistula is not ready in 3 weeks will proceed with a fistulogram.

## 2022-10-19 NOTE — PHYSICAL EXAM
[FreeTextEntry1] : General: this is a pleasant patient in no acute distress\par \par HEENT conjunctiva are normal, no tenderness in head\par \par CV: normal pulses, regular rate and rhythm, no peripheral edema noted\par \par Lungs: breathing is non-labored\par \par abd: soft and non-distended\par \par MSK:\par SLR: \par CARLOS:\par range of motion:\par tinnels: \par spurling:\par Occipital nerve tenderness:\par \par Mental status:\par Alert and oriented to person, not place and not time, normal speech and comprehension. \par \par Cranial Nerves:\par extra-occular movements in tact without nystagmus, normal saccades and smooth pursuit, Face symmetric and facial strength symmetric, facial sensation symmetric, \par \par Motor: normal bulk and tone throughout. no abnormal movements.  Full 5/5 strength uppers and lower extremities proximally and distally\par \par Sensory: in tact and symmetric to vibration, light tough, temperature except decreased PP to knees, decreased vib in toes ank ankles\par \par Cerebellar: normal finger-nose-finger bilaterally\par \par Reflexes: 1+ in the upper and 2+ knees ankles absent and symmetric.  toes are bilaterally mute\par \par Gait: stable with cane but with wide base and cautious, cannot tandem\par \par Stances:\par Romberg: sways\par

## 2022-10-19 NOTE — REASON FOR VISIT
[de-identified] : ESRD s/p creation of a left brachial cephalic AVF [de-identified] : 78 yo male returns in follow up. He is s/p a left brachial cephalic AVF.  Since his last appointment he was started on HD. He is currently undergoing HD via a [facility] PC. He denies pain or numbness in his hand.  [Spouse] : spouse

## 2022-11-01 ENCOUNTER — APPOINTMENT (OUTPATIENT)
Dept: NEUROLOGY | Facility: CLINIC | Age: 77
End: 2022-11-01

## 2022-11-09 ENCOUNTER — APPOINTMENT (OUTPATIENT)
Dept: VASCULAR SURGERY | Facility: CLINIC | Age: 77
End: 2022-11-09

## 2022-11-09 VITALS — HEART RATE: 58 BPM | SYSTOLIC BLOOD PRESSURE: 96 MMHG | DIASTOLIC BLOOD PRESSURE: 60 MMHG

## 2022-11-09 DIAGNOSIS — T82.590A OTHER MECHANICAL COMPLICATION OF SURGICALLY CREATED ARTERIOVENOUS FISTULA, INITIAL ENCOUNTER: ICD-10-CM

## 2022-11-09 PROCEDURE — 99024 POSTOP FOLLOW-UP VISIT: CPT

## 2022-11-09 NOTE — PHYSICAL EXAM
[2+] : left 2+ [Ankle Swelling (On Exam)] : not present [Ankle Swelling Bilaterally] : bilaterally  [Alert] : alert [Oriented to Person] : oriented to person [Oriented to Place] : oriented to place [Oriented to Time] : oriented to time [de-identified] : Awake and Alert, ill appearing [de-identified] : KEYLA PC in place [FreeTextEntry1] : palpable thrill in AVF [de-identified] : Incision healed, no edema, hand warm and pink [de-identified] : No gross motor or sensory deficits left hand [de-identified] : Appropriate

## 2022-11-09 NOTE — REASON FOR VISIT
[de-identified] : s/p left brachial cephalic AVF [de-identified] : 76 yo male s/p left brachial cephalic AVF creation. He is currently undergoing HD via a [facility] PC. He underwent a HD access us at his last visit which demonstrated an anastomotic stenosis with preserved velocities and > 6mm. He is here to see if the fistula can be accessed

## 2022-11-09 NOTE — DISCUSSION/SUMMARY
[FreeTextEntry1] : 77-year-old male with chronic kidney disease at age 6.  He is status post creation of a left brachiocephalic AV fistula.  I do not think the fistula was accessible at this time.  I recommended that he undergo fistulogram with balloon angioplasty.  The risks and benefits of the procedure including infection bleeding and fistula failure were discussed with the patient and his wife through .  They agreed to proceed.  I also discussed this with Dr. Lunsford who believes it is necessary and agrees with the plan.\par

## 2022-11-14 ENCOUNTER — APPOINTMENT (OUTPATIENT)
Dept: CARDIOLOGY | Facility: CLINIC | Age: 77
End: 2022-11-14

## 2022-11-14 VITALS
OXYGEN SATURATION: 99 % | WEIGHT: 176 LBS | BODY MASS INDEX: 28.28 KG/M2 | HEART RATE: 55 BPM | RESPIRATION RATE: 12 BRPM | SYSTOLIC BLOOD PRESSURE: 110 MMHG | DIASTOLIC BLOOD PRESSURE: 60 MMHG | HEIGHT: 66 IN

## 2022-11-14 DIAGNOSIS — I50.31 ACUTE DIASTOLIC (CONGESTIVE) HEART FAILURE: ICD-10-CM

## 2022-11-14 PROCEDURE — 99214 OFFICE O/P EST MOD 30 MIN: CPT

## 2022-11-14 RX ORDER — HYDRALAZINE HYDROCHLORIDE 25 MG/1
25 TABLET ORAL 3 TIMES DAILY
Refills: 0 | Status: DISCONTINUED | COMMUNITY
End: 2022-11-14

## 2022-11-14 NOTE — ASSESSMENT
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 However, should he develop exertional complaints, will perform nuclear stress test at that time.\par \par Blood pressure is currently low/low normal per wife on current regimen.\par Will discontinue hydralazine 25 mg po tid given reported low BP during HD.\par Will continue bisoprolol 10 mg po daily, clonidine 0.1 mg po bid, and nifedipine ER 60 mg po daily.\par Patient to call if BP remains low. Will decrease nifedipine dose of discontinue clonidine at that time. 77.2

## 2022-11-14 NOTE — PHYSICAL EXAM
[Well Nourished] : well nourished [No Acute Distress] : no acute distress [Normal Conjunctiva] : normal conjunctiva [Normal Venous Pressure] : normal venous pressure [No Carotid Bruit] : no carotid bruit [Normal Rate] : normal [Rhythm Regular] : regular [Normal S1] : normal S1 [Normal S2] : normal S2 [No Murmur] : no murmurs heard [No Pitting Edema] : no pitting edema present [Clear Lung Fields] : clear lung fields [Good Air Entry] : good air entry [No Respiratory Distress] : no respiratory distress  [No Edema] : no edema [No Cyanosis] : no cyanosis [No Clubbing] : no clubbing [Right Carotid Bruit] : no bruit heard over the right carotid [Left Carotid Bruit] : no bruit heard over the left carotid

## 2022-11-14 NOTE — CARDIOLOGY SUMMARY
[de-identified] : \par 9/29/22 ECG (at [facility]): Sinus sravani, rate 58 bpm, LAD, incomplete RBBB, inferior infarct, non-specific T wave abnormalities, poor R wave progression\par 7/6/22 ECG: Sinus rhythm, rate 60 bpm, RSR' pattern in V1, inferolateral infarct, nonspecific T wave abnormalities, poor R wave progression\par 3/12/21 ECG: Sinus, rate 63 bpm, RSR' pattern in V1, inferior infarct, poor R wave progression, non-specific T wave abnormalities\par 11/24/20 ECG: Sinus, rate 66 bpm, RSR' pattern in V1, inferior infarct, poor R wave progression\par 7/16/19 ECG: Sinus, rate 78 bpm, RSR' pattern in V1, inferior infarct\par  [de-identified] : \par 9/2/16 Regadenoson sestamibi: Medium sized moderate infarct of basal to mid inferior and inferolateral walls. Moderate hypokinesis of basal to mid inferior and inferolateral walls. LVEF 48%.\par  [de-identified] : \par 9/22/22 Echo (at [facility]): Normal LV systolic function, LVEF 55%. Conc LVH. Normal RV size and systolic function. Dilated LA. MAC and MV thickening. AV sclerosis.\par \par 7/12/22 Echo: Normal LV size and systolic/diastolic function, LVEF 69%. Mild LVH. AV sclerosis. Mild MR/TR.\par \par 10/10/16 Cherie-op MINESH (at [facility]): Normal LV size and systolic function, LVEF 60%. Mild conc LVH. Min MR. Thoracic aorta atheroma (Lewis) grade II (severe intimal thickening).\par  [de-identified] :   \par 9/14/16 Cardiac cath:\par 30% LM\par 40% prox LAD, 95% mid LAD\par 95% D1 (small vessel), 99% D2 (small vessel)\par 90% LAD septal \par 99% distal Cfx\par 70% OM1 (FFR = 0.82) with severe stenosis of smaller inferior branch\par 90% prox RCA, 95% mid RCA (severe diffuse disease), 70% distal RCA\par 95% ostial rPDA\par LVEF 44%, diaphragmatic & posterior basal akinesis

## 2022-11-14 NOTE — HISTORY OF PRESENT ILLNESS
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 Patient presents today for follow-up. Patient's wife reports that his BP has been running low on current regimen particularly during hemodialysis. Patient denies chest pain, dyspnea, palpitations, syncope, edema, melena, hematochezia, or hematemesis.\par \par PMD: David Garcia MD\par Renal: Grant Lunsford MD

## 2022-12-01 ENCOUNTER — NON-APPOINTMENT (OUTPATIENT)
Age: 77
End: 2022-12-01

## 2022-12-02 ENCOUNTER — RESULT REVIEW (OUTPATIENT)
Age: 77
End: 2022-12-02

## 2022-12-03 ENCOUNTER — RESULT REVIEW (OUTPATIENT)
Age: 77
End: 2022-12-03

## 2022-12-06 ENCOUNTER — APPOINTMENT (OUTPATIENT)
Dept: VASCULAR SURGERY | Facility: HOSPITAL | Age: 77
End: 2022-12-06

## 2022-12-07 ENCOUNTER — TRANSCRIPTION ENCOUNTER (OUTPATIENT)
Age: 77
End: 2022-12-07

## 2022-12-27 ENCOUNTER — APPOINTMENT (OUTPATIENT)
Dept: VASCULAR SURGERY | Facility: CLINIC | Age: 77
End: 2022-12-27
Payer: MEDICARE

## 2023-01-04 ENCOUNTER — APPOINTMENT (OUTPATIENT)
Dept: VASCULAR SURGERY | Facility: CLINIC | Age: 78
End: 2023-01-04
Payer: MEDICARE

## 2023-01-04 VITALS — WEIGHT: 176 LBS | HEIGHT: 66 IN | BODY MASS INDEX: 28.28 KG/M2

## 2023-01-04 DIAGNOSIS — Z99.2 END STAGE RENAL DISEASE: ICD-10-CM

## 2023-01-04 DIAGNOSIS — N18.6 END STAGE RENAL DISEASE: ICD-10-CM

## 2023-01-04 PROCEDURE — 93990 DOPPLER FLOW TESTING: CPT

## 2023-01-04 PROCEDURE — 36589 REMOVAL TUNNELED CV CATH: CPT

## 2023-01-04 NOTE — PROCEDURE
[FreeTextEntry1] : After consent was obtained the patient was prepped with Betadine. The patient was given an injection of 1% plain lidocaine in the chest wall. The catheter cuff was dissected free from the surrounding tissue. The catheter was removed in its entirety. Manual compression was held on the neck. At the end of manual compression there was no bleeding. A sterile dressing was applied. The patient tolerated the procedure.\par

## 2023-01-04 NOTE — PHYSICAL EXAM
[2+] : left 2+ [Ankle Swelling Bilaterally] : bilaterally  [Alert] : alert [Oriented to Person] : oriented to person [Oriented to Place] : oriented to place [Oriented to Time] : oriented to time [Ankle Swelling (On Exam)] : not present [de-identified] : Awake and Alert, ill appearing [de-identified] : KEYLA PC in place [FreeTextEntry1] : palpable thrill in AVF [de-identified] : Hhand warm and pink [de-identified] : No gross motor or sensory deficits left hand [de-identified] : Appropriate

## 2023-01-04 NOTE — HISTORY OF PRESENT ILLNESS
[FreeTextEntry1] : 76 yo male s/p left brachial cephalic AVF creation. He is now s/p a fistulogram and PTA of the fistula. He reports that he has been undergoing HD via the AVF for the last several weeks.

## 2023-01-04 NOTE — ASSESSMENT
[FreeTextEntry1] : 77-year-old male with ESRD.  He is status post creation of a left brachiocephalic AV fistula and a fistulogram. He has been undergoing HD via the AVF without difficulty. I confirmed this with the HD center and was asked to remove the PC. The PC was removed without difficulty. HE tolerated the procedure. He will continue HD via the AVF. Follow up in 1 month. \par

## 2023-01-07 PROBLEM — N18.6 ESRD (END STAGE RENAL DISEASE) ON DIALYSIS: Status: ACTIVE | Noted: 2022-10-13

## 2023-02-01 ENCOUNTER — APPOINTMENT (OUTPATIENT)
Dept: VASCULAR SURGERY | Facility: CLINIC | Age: 78
End: 2023-02-01

## 2023-03-23 ENCOUNTER — APPOINTMENT (OUTPATIENT)
Dept: PODIATRY | Facility: CLINIC | Age: 78
End: 2023-03-23

## 2023-04-13 ENCOUNTER — APPOINTMENT (OUTPATIENT)
Dept: PODIATRY | Facility: CLINIC | Age: 78
End: 2023-04-13
Payer: MEDICARE

## 2023-04-13 VITALS — WEIGHT: 176 LBS | BODY MASS INDEX: 28.28 KG/M2 | HEIGHT: 66 IN

## 2023-04-13 DIAGNOSIS — E11.51 TYPE 2 DIABETES MELLITUS WITH DIABETIC PERIPHERAL ANGIOPATHY W/OUT GANGRENE: ICD-10-CM

## 2023-04-13 DIAGNOSIS — B35.1 TINEA UNGUIUM: ICD-10-CM

## 2023-04-13 DIAGNOSIS — L60.0 INGROWING NAIL: ICD-10-CM

## 2023-04-13 DIAGNOSIS — Z79.4 TYPE 2 DIABETES MELLITUS WITH DIABETIC PERIPHERAL ANGIOPATHY W/OUT GANGRENE: ICD-10-CM

## 2023-04-13 DIAGNOSIS — M79.672 PAIN IN LEFT FOOT: ICD-10-CM

## 2023-04-13 DIAGNOSIS — M79.671 PAIN IN RIGHT FOOT: ICD-10-CM

## 2023-04-13 PROCEDURE — 11721 DEBRIDE NAIL 6 OR MORE: CPT

## 2023-04-13 PROCEDURE — 99203 OFFICE O/P NEW LOW 30 MIN: CPT | Mod: 25

## 2023-04-13 RX ORDER — DAPAGLIFLOZIN 10 MG/1
10 TABLET, FILM COATED ORAL DAILY
Refills: 0 | Status: DISCONTINUED | COMMUNITY
End: 2023-04-13

## 2023-04-13 NOTE — PHYSICAL EXAM
[General Appearance - In No Acute Distress] : in no acute distress [General Appearance - Well Nourished] : well nourished [General Appearance - Well Developed] : well developed [FreeTextEntry3] : The vascular exam reveals decreased pedal pulses bilateral, a capillary fill time of 3-5 seconds,  mild atrophic skin changes, mild varicosities absence of hair growth, but no cyanosis, clubbing or mottling seen. [de-identified] : overall muscle strength testing is decreased, atrophy and overall weakness present.\par  [Skin Color & Pigmentation] : normal skin color and pigmentation [Skin Turgor] : normal skin turgor [] : no rash [Skin Lesions] : no skin lesions [Foot Ulcer] : no foot ulcer [Skin Induration] : no skin induration [FreeTextEntry1] : The patient has all contributing factors to onychomycosis including but not limited to thickness, subungual debris, discoloration and partial lysis and they are brittle when cut.\par Evaluation of the area of chief complaint reveals a mild noninfected but reddened distal portion of the afffected nail. There is no discharge or drainage there is no sign of infectious process signs and symptoms are consistent with the noninfected ingrown nail to left GTN both sides [Sensation] : the sensory exam was normal to light touch and pinprick [No Focal Deficits] : no focal deficits [Deep Tendon Reflexes (DTR)] : deep tendon reflexes were 2+ and symmetric [Motor Exam] : the motor exam was normal

## 2023-04-13 NOTE — REASON FOR VISIT
[Initial Visit] : an initial visit for [Ingrown Nail] : ingrown nail [Onychomycosis] : onychomycosis

## 2023-04-13 NOTE — HISTORY OF PRESENT ILLNESS
[FreeTextEntry1] : The above-named patient was referred here as a new patient for evaluation and management of diabetes as well as a complete podiatric evaluation regarding diabetes and their lower extremity. An overall podiatric evaluation and management is requested\par

## 2023-04-13 NOTE — PROCEDURE
[FreeTextEntry1] : A lengthy discussion with the patient regarding diabetes and the manifestations second occur in the feet. The discussion included but was not limited to nail care, treatment of open wound, treatment of calluses, shoe gear, as well as statistics regarding diabetes as it relates to loss of toe, toes, midfoot, as well as mortality Re: diabetics wind up with a midfoot amputation, transmetatarsal amputation, as well as 50 percent of diabetics who suffer loss of the leg suffering mortality within 5 years. Educational literature was dispensed. Overall diabetic education as it regard to the foot was discussed with all questions asked and answered appropriately. I've advised the patient should there be any concerns regarding nails, infection, open wounds, interdigital problems, or acute injury that they should contact the office immediately.\par Using sterile instrumentation debridement of all nails manually and electrically to decrease thickness, pain and girth and make shoe gear more comfortable with "slant back" procedure of any bordering spicules causing pain\par \par Follow up 3 months\par

## 2023-04-13 NOTE — REVIEW OF SYSTEMS
[Arthralgias] : arthralgias [As Noted in HPI] : as noted in HPI [Skin Lesions] : no skin lesions [Skin Wound] : no skin wound [Itching] : no itching [Dry Skin] : no dry skin [Confused] : confusion [Negative] : Heme/Lymph

## 2023-06-22 ENCOUNTER — APPOINTMENT (OUTPATIENT)
Dept: CARDIOLOGY | Facility: CLINIC | Age: 78
End: 2023-06-22
Payer: MEDICARE

## 2023-06-22 ENCOUNTER — NON-APPOINTMENT (OUTPATIENT)
Age: 78
End: 2023-06-22

## 2023-06-22 VITALS
WEIGHT: 171 LBS | OXYGEN SATURATION: 97 % | DIASTOLIC BLOOD PRESSURE: 82 MMHG | HEIGHT: 66 IN | RESPIRATION RATE: 14 BRPM | HEART RATE: 59 BPM | BODY MASS INDEX: 27.48 KG/M2 | SYSTOLIC BLOOD PRESSURE: 201 MMHG

## 2023-06-22 DIAGNOSIS — I10 ESSENTIAL (PRIMARY) HYPERTENSION: ICD-10-CM

## 2023-06-22 DIAGNOSIS — I25.10 ATHEROSCLEROTIC HEART DISEASE OF NATIVE CORONARY ARTERY W/OUT ANGINA PECTORIS: ICD-10-CM

## 2023-06-22 DIAGNOSIS — E78.5 HYPERLIPIDEMIA, UNSPECIFIED: ICD-10-CM

## 2023-06-22 PROCEDURE — 93000 ELECTROCARDIOGRAM COMPLETE: CPT

## 2023-06-22 PROCEDURE — 99214 OFFICE O/P EST MOD 30 MIN: CPT | Mod: 25

## 2023-06-22 RX ORDER — CLOPIDOGREL 75 MG/1
75 TABLET, FILM COATED ORAL DAILY
Refills: 0 | Status: ACTIVE | COMMUNITY

## 2023-06-22 RX ORDER — NIFEDIPINE 90 MG/1
90 TABLET, EXTENDED RELEASE ORAL DAILY
Qty: 90 | Refills: 3 | Status: ACTIVE | COMMUNITY
Start: 2023-06-22 | End: 1900-01-01

## 2023-06-22 RX ORDER — NIFEDIPINE 60 MG/1
60 TABLET, EXTENDED RELEASE ORAL DAILY
Refills: 0 | Status: DISCONTINUED | COMMUNITY
End: 2023-06-22

## 2023-06-22 NOTE — PHYSICAL EXAM
[Well Nourished] : well nourished [No Acute Distress] : no acute distress [Normal Conjunctiva] : normal conjunctiva [Normal Venous Pressure] : normal venous pressure [No Carotid Bruit] : no carotid bruit [Normal Rate] : normal [Rhythm Regular] : regular [Normal S1] : normal S1 [Normal S2] : normal S2 [No Murmur] : no murmurs heard [No Pitting Edema] : no pitting edema present [Clear Lung Fields] : clear lung fields [Good Air Entry] : good air entry [No Respiratory Distress] : no respiratory distress  [No Edema] : no edema [Right Carotid Bruit] : no bruit heard over the right carotid [Left Carotid Bruit] : no bruit heard over the left carotid

## 2023-06-22 NOTE — ASSESSMENT
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 (LIMA to LAD, LIMA-DWAYNE to OM, SVG to PDA), and recently diagnosed HFpEF.\par Echo on 9/22/22 demonstrated normal LV systolic function, LVEF 55%, conc LVH, dilated LA.\par \par Patient is clinically stable from CAD/HFpEF standpoint.\par Will continue to monitor on clopidogrel, bisoprolol, atorvastatin, and Zetia.\par \par Blood pressure is uncontrolled, but wife reports that his BP is better after getting hemodialysis.\par Will increase nifedipine ER to 90 mg po daily, and continue bisoprolol 10 mg po daily.\par Patient was instructed to monitor BP and call if BP readings are persistently elevated.

## 2023-06-22 NOTE — HISTORY OF PRESENT ILLNESS
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 Patient presents today for follow-up. Patient denies chest pain, dyspnea, palpitations, syncope, edema, melena, hematochezia, or hematemesis. Wife reports that his BP is usually controlled after undergoing hemodialysis.  \par \par PMD: David Garcia MD\par Renal: Grant Lunsford MD

## 2023-06-22 NOTE — CARDIOLOGY SUMMARY
[de-identified] : \par 6/22/23 ECG: Sinus bradycardia, rate 59 bpm, RSR' pattern in V1, inferior infarct, poor R wave progression, nonspecific T-wave abnormalities\par 9/29/22 ECG (at [place]): Sinus sravani, rate 58 bpm, LAD, incomplete RBBB, inferior infarct, non-specific T wave abnormalities, poor R wave progression\par 7/6/22 ECG: Sinus rhythm, rate 60 bpm, RSR' pattern in V1, inferolateral infarct, nonspecific T wave abnormalities, poor R wave progression\par 3/12/21 ECG: Sinus, rate 63 bpm, RSR' pattern in V1, inferior infarct, poor R wave progression, non-specific T wave abnormalities\par  [de-identified] : \par 9/2/16 Regadenoson sestamibi: Medium sized moderate infarct of basal to mid inferior and inferolateral walls. Moderate hypokinesis of basal to mid inferior and inferolateral walls. LVEF 48%.\par  [de-identified] : \par 9/22/22 Echo (at [place]): Normal LV systolic function, LVEF 55%. Conc LVH. Normal RV size and systolic function. Dilated LA. MAC and MV thickening. AV sclerosis.\par \par 7/12/22 Echo: Normal LV size and systolic/diastolic function, LVEF 69%. Mild LVH. AV sclerosis. Mild MR/TR.\par \par 10/10/16 Cherie-op MINESH (at [facility]): Normal LV size and systolic function, LVEF 60%. Mild conc LVH. Min MR. Thoracic aorta atheroma (Lewis) grade II (severe intimal thickening).\par  [de-identified] :   \par 9/14/16 Cardiac cath:\par 30% LM\par 40% prox LAD, 95% mid LAD\par 95% D1 (small vessel), 99% D2 (small vessel)\par 90% LAD septal \par 99% distal Cfx\par 70% OM1 (FFR = 0.82) with severe stenosis of smaller inferior branch\par 90% prox RCA, 95% mid RCA (severe diffuse disease), 70% distal RCA\par 95% ostial rPDA\par LVEF 44%, diaphragmatic & posterior basal akinesis

## 2023-08-31 RX ORDER — BISOPROLOL FUMARATE 10 MG/1
10 TABLET, FILM COATED ORAL DAILY
Qty: 90 | Refills: 3 | Status: ACTIVE | COMMUNITY
Start: 1900-01-01 | End: 1900-01-01

## 2023-09-01 ENCOUNTER — APPOINTMENT (OUTPATIENT)
Dept: GERIATRICS | Facility: CLINIC | Age: 78
End: 2023-09-01

## 2024-02-26 ENCOUNTER — APPOINTMENT (OUTPATIENT)
Dept: FAMILY MEDICINE | Facility: CLINIC | Age: 79
End: 2024-02-26

## 2024-06-22 NOTE — ASSESSMENT
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 \par \par Patient is clinically stable from CAD standpoint.\par Will continue to monitor on current meds (clopidogrel, atorvastatin, and bisoprolol). \par \par Blood pressure is not adequately controlled on bisoprolol 10 mg po daily, furosemide 20 mg po daily, and losartan 50 mg po daily.\par Nifedipine was discontinued by PMD due to leg edema.\par Will increase losartan to 100 mg po daily. Patient to return in 3 weeks for BP check and labs (CBC, CMP, and lipid panel). Yes

## 2024-10-17 ENCOUNTER — NON-APPOINTMENT (OUTPATIENT)
Age: 79
End: 2024-10-17

## 2024-10-17 ENCOUNTER — APPOINTMENT (OUTPATIENT)
Dept: CARDIOLOGY | Facility: CLINIC | Age: 79
End: 2024-10-17
Payer: MEDICARE

## 2024-10-17 VITALS
WEIGHT: 160 LBS | OXYGEN SATURATION: 99 % | RESPIRATION RATE: 14 BRPM | SYSTOLIC BLOOD PRESSURE: 172 MMHG | HEART RATE: 59 BPM | BODY MASS INDEX: 25.82 KG/M2 | DIASTOLIC BLOOD PRESSURE: 70 MMHG

## 2024-10-17 DIAGNOSIS — E78.5 HYPERLIPIDEMIA, UNSPECIFIED: ICD-10-CM

## 2024-10-17 DIAGNOSIS — I10 ESSENTIAL (PRIMARY) HYPERTENSION: ICD-10-CM

## 2024-10-17 DIAGNOSIS — I25.10 ATHEROSCLEROTIC HEART DISEASE OF NATIVE CORONARY ARTERY W/OUT ANGINA PECTORIS: ICD-10-CM

## 2024-10-17 PROCEDURE — 93000 ELECTROCARDIOGRAM COMPLETE: CPT

## 2024-10-17 PROCEDURE — 99214 OFFICE O/P EST MOD 30 MIN: CPT

## 2024-10-17 RX ORDER — CLONIDINE HYDROCHLORIDE 0.2 MG/1
0.2 TABLET ORAL TWICE DAILY
Qty: 180 | Refills: 2 | Status: ACTIVE | COMMUNITY
Start: 2024-10-17 | End: 1900-01-01

## 2024-10-17 RX ORDER — CLONIDINE HYDROCHLORIDE 0.1 MG/1
0.1 TABLET ORAL TWICE DAILY
Qty: 180 | Refills: 0 | Status: DISCONTINUED | COMMUNITY
Start: 2024-10-10 | End: 2024-10-17

## 2024-10-21 ENCOUNTER — NON-APPOINTMENT (OUTPATIENT)
Age: 79
End: 2024-10-21

## 2024-10-29 ENCOUNTER — OFFICE (OUTPATIENT)
Dept: URBAN - METROPOLITAN AREA CLINIC 29 | Facility: CLINIC | Age: 79
Setting detail: OPHTHALMOLOGY
End: 2024-10-29
Payer: MEDICARE

## 2024-10-29 ENCOUNTER — RX ONLY (RX ONLY)
Age: 79
End: 2024-10-29

## 2024-10-29 DIAGNOSIS — H01.001: ICD-10-CM

## 2024-10-29 DIAGNOSIS — H43.813: ICD-10-CM

## 2024-10-29 DIAGNOSIS — H16.223: ICD-10-CM

## 2024-10-29 DIAGNOSIS — H01.004: ICD-10-CM

## 2024-10-29 PROBLEM — E11.9 DIABETES TYPE 2 NO RETINOPATHY: Status: ACTIVE | Noted: 2024-10-29

## 2024-10-29 PROCEDURE — 92004 COMPRE OPH EXAM NEW PT 1/>: CPT | Performed by: OPTOMETRIST

## 2024-10-29 ASSESSMENT — TEAR BREAK UP TIME (TBUT)
OS_TBUT: 1+
OD_TBUT: 1+

## 2024-10-29 ASSESSMENT — REFRACTION_CURRENTRX
OS_VPRISM_DIRECTION: PROGS
OS_OVR_VA: 20/
OD_AXIS: 175
OS_OVR_VA: 20/
OS_SPHERE: -1.75
OD_CYLINDER: +2.50
OD_OVR_VA: 20/
OS_SPHERE: -1.50
OS_CYLINDER: +2.50
OD_OVR_VA: 20/
OD_VPRISM_DIRECTION: PROGS
OS_VPRISM_DIRECTION: SV
OS_ADD: +2.50
OD_AXIS: 177
OD_SPHERE: -2.00
OD_ADD: +2.50
OD_CYLINDER: +2.00
OS_AXIS: 4
OD_VPRISM_DIRECTION: SV
OS_CYLINDER: +1.75
OD_SPHERE: -2.00
OS_AXIS: 180

## 2024-10-29 ASSESSMENT — REFRACTION_AUTOREFRACTION
OD_AXIS: 175
OD_SPHERE: -2.00
OS_SPHERE: -1.50
OS_AXIS: 170
OS_CYLINDER: +2.00
OD_CYLINDER: +2.25

## 2024-10-29 ASSESSMENT — REFRACTION_MANIFEST
OD_CYLINDER: +2.50
OD_VA1: 20/30
OD_SPHERE: -2.00
OD_ADD: +2.50
OD_VA1: 20/30
OS_AXIS: 005
OD_CYLINDER: +2.50
OS_CYLINDER: +2.50
OS_CYLINDER: +2.50
OS_ADD: +2.50
OD_SPHERE: -2.00
OS_VA1: 20/30
OS_VA1: 20/25
OS_SPHERE: -1.75
OS_SPHERE: -1.75
OD_ADD: +2.50
OD_AXIS: 175
OS_AXIS: 005
OD_AXIS: 175
OS_ADD: +2.50

## 2024-10-29 ASSESSMENT — CONFRONTATIONAL VISUAL FIELD TEST (CVF)
OD_FINDINGS: FULL
OS_FINDINGS: FULL

## 2024-10-29 ASSESSMENT — LID EXAM ASSESSMENTS
OD_COMMENTS: BLEPHARITIS CHANGES OF THE EYELID MARGINS
OS_COMMENTS: BLEPHARITIS CHANGES OF THE EYELID MARGINS

## 2024-10-29 ASSESSMENT — KERATOMETRY
OS_K1POWER_DIOPTERS: 43.00
OD_K1POWER_DIOPTERS: 42.75
OS_K2POWER_DIOPTERS: 45.25
OD_K2POWER_DIOPTERS: 44.75
OS_AXISANGLE_DEGREES: 6
OD_AXISANGLE_DEGREES: 176

## 2024-10-29 ASSESSMENT — VISUAL ACUITY
OS_BCVA: 20/30+1
OD_BCVA: 20/25-2

## 2024-11-21 ENCOUNTER — APPOINTMENT (OUTPATIENT)
Dept: NEUROLOGY | Facility: CLINIC | Age: 79
End: 2024-11-21
Payer: MEDICARE

## 2024-11-21 VITALS
SYSTOLIC BLOOD PRESSURE: 160 MMHG | DIASTOLIC BLOOD PRESSURE: 69 MMHG | HEIGHT: 66 IN | BODY MASS INDEX: 25.71 KG/M2 | WEIGHT: 160 LBS | OXYGEN SATURATION: 98 % | HEART RATE: 80 BPM

## 2024-11-21 DIAGNOSIS — G47.52 REM SLEEP BEHAVIOR DISORDER: ICD-10-CM

## 2024-11-21 DIAGNOSIS — R44.1 VISUAL HALLUCINATIONS: ICD-10-CM

## 2024-11-21 PROCEDURE — 99215 OFFICE O/P EST HI 40 MIN: CPT

## 2024-11-21 RX ORDER — MEMANTINE HYDROCHLORIDE 5 MG/1
5 TABLET, FILM COATED ORAL
Qty: 60 | Refills: 3 | Status: ACTIVE | COMMUNITY
Start: 2024-11-21 | End: 1900-01-01

## 2024-11-25 RX ORDER — INSULIN LISPRO 100 [IU]/ML
100 INJECTION, SOLUTION INTRAVENOUS; SUBCUTANEOUS
Refills: 0 | Status: ACTIVE | COMMUNITY

## 2025-01-02 ENCOUNTER — APPOINTMENT (OUTPATIENT)
Dept: CARDIOLOGY | Facility: CLINIC | Age: 80
End: 2025-01-02
Payer: MEDICARE

## 2025-01-02 VITALS
RESPIRATION RATE: 14 BRPM | SYSTOLIC BLOOD PRESSURE: 145 MMHG | HEIGHT: 66 IN | WEIGHT: 160 LBS | HEART RATE: 56 BPM | DIASTOLIC BLOOD PRESSURE: 60 MMHG | BODY MASS INDEX: 25.71 KG/M2 | OXYGEN SATURATION: 99 %

## 2025-01-02 DIAGNOSIS — I50.30 UNSPECIFIED DIASTOLIC (CONGESTIVE) HEART FAILURE: ICD-10-CM

## 2025-01-02 DIAGNOSIS — E78.5 HYPERLIPIDEMIA, UNSPECIFIED: ICD-10-CM

## 2025-01-02 DIAGNOSIS — I10 ESSENTIAL (PRIMARY) HYPERTENSION: ICD-10-CM

## 2025-01-02 DIAGNOSIS — I25.10 ATHEROSCLEROTIC HEART DISEASE OF NATIVE CORONARY ARTERY W/OUT ANGINA PECTORIS: ICD-10-CM

## 2025-01-02 PROCEDURE — 99214 OFFICE O/P EST MOD 30 MIN: CPT

## 2025-01-09 ENCOUNTER — RESULT REVIEW (OUTPATIENT)
Age: 80
End: 2025-01-09

## 2025-01-23 ENCOUNTER — APPOINTMENT (OUTPATIENT)
Dept: PODIATRY | Facility: CLINIC | Age: 80
End: 2025-01-23

## 2025-01-27 ENCOUNTER — NON-APPOINTMENT (OUTPATIENT)
Age: 80
End: 2025-01-27

## 2025-01-28 ENCOUNTER — APPOINTMENT (OUTPATIENT)
Dept: GERIATRICS | Facility: CLINIC | Age: 80
End: 2025-01-28
Payer: MEDICARE

## 2025-01-28 VITALS
HEIGHT: 66 IN | SYSTOLIC BLOOD PRESSURE: 142 MMHG | BODY MASS INDEX: 25.71 KG/M2 | HEART RATE: 57 BPM | WEIGHT: 160 LBS | TEMPERATURE: 96.8 F | DIASTOLIC BLOOD PRESSURE: 64 MMHG | OXYGEN SATURATION: 99 %

## 2025-01-28 DIAGNOSIS — K59.09 OTHER CONSTIPATION: ICD-10-CM

## 2025-01-28 DIAGNOSIS — Z71.89 OTHER SPECIFIED COUNSELING: ICD-10-CM

## 2025-01-28 DIAGNOSIS — E11.51 TYPE 2 DIABETES MELLITUS WITH DIABETIC PERIPHERAL ANGIOPATHY W/OUT GANGRENE: ICD-10-CM

## 2025-01-28 DIAGNOSIS — H91.90 UNSPECIFIED HEARING LOSS, UNSPECIFIED EAR: ICD-10-CM

## 2025-01-28 DIAGNOSIS — Z79.4 TYPE 2 DIABETES MELLITUS WITH DIABETIC PERIPHERAL ANGIOPATHY W/OUT GANGRENE: ICD-10-CM

## 2025-01-28 DIAGNOSIS — G31.9 DEGENERATIVE DISEASE OF NERVOUS SYSTEM, UNSPECIFIED: ICD-10-CM

## 2025-01-28 DIAGNOSIS — F03.B2 UNSPECIFIED DEMENTIA, MODERATE, WITH PSYCHOTIC DISTURBANCE: ICD-10-CM

## 2025-01-28 DIAGNOSIS — R44.1 VISUAL HALLUCINATIONS: ICD-10-CM

## 2025-01-28 PROCEDURE — 99205 OFFICE O/P NEW HI 60 MIN: CPT

## 2025-01-28 PROCEDURE — G2211 COMPLEX E/M VISIT ADD ON: CPT

## 2025-01-28 RX ORDER — TRAZODONE HYDROCHLORIDE 50 MG/1
50 TABLET ORAL
Qty: 45 | Refills: 1 | Status: ACTIVE | COMMUNITY
Start: 2025-01-28 | End: 1900-01-01

## 2025-01-28 RX ORDER — BLOOD-GLUCOSE SENSOR
EACH MISCELLANEOUS
Qty: 1 | Refills: 0 | Status: ACTIVE | COMMUNITY
Start: 2025-01-28 | End: 1900-01-01

## 2025-01-28 RX ORDER — BLOOD-GLUCOSE,RECEIVER,CONT
EACH MISCELLANEOUS
Qty: 1 | Refills: 0 | Status: ACTIVE | COMMUNITY
Start: 2025-01-28 | End: 1900-01-01

## 2025-02-13 ENCOUNTER — APPOINTMENT (OUTPATIENT)
Dept: NEUROSURGERY | Facility: CLINIC | Age: 80
End: 2025-02-13
Payer: MEDICARE

## 2025-02-13 ENCOUNTER — NON-APPOINTMENT (OUTPATIENT)
Age: 80
End: 2025-02-13

## 2025-02-13 VITALS
BODY MASS INDEX: 25.71 KG/M2 | OXYGEN SATURATION: 99 % | WEIGHT: 160 LBS | SYSTOLIC BLOOD PRESSURE: 199 MMHG | HEIGHT: 66 IN | DIASTOLIC BLOOD PRESSURE: 75 MMHG | HEART RATE: 55 BPM

## 2025-02-13 PROCEDURE — 99205 OFFICE O/P NEW HI 60 MIN: CPT

## 2025-03-10 DIAGNOSIS — I10 ESSENTIAL (PRIMARY) HYPERTENSION: ICD-10-CM

## 2025-03-10 DIAGNOSIS — N18.6 END STAGE RENAL DISEASE: ICD-10-CM

## 2025-03-10 DIAGNOSIS — Z99.2 END STAGE RENAL DISEASE: ICD-10-CM

## 2025-03-10 RX ORDER — LOSARTAN POTASSIUM 50 MG/1
50 TABLET, FILM COATED ORAL DAILY
Qty: 90 | Refills: 3 | Status: ACTIVE | COMMUNITY
Start: 2025-03-10 | End: 1900-01-01

## 2025-04-03 ENCOUNTER — APPOINTMENT (OUTPATIENT)
Dept: CARDIOLOGY | Facility: CLINIC | Age: 80
End: 2025-04-03
Payer: MEDICARE

## 2025-04-03 ENCOUNTER — NON-APPOINTMENT (OUTPATIENT)
Age: 80
End: 2025-04-03

## 2025-04-03 VITALS
HEIGHT: 66 IN | HEART RATE: 58 BPM | WEIGHT: 158.73 LBS | SYSTOLIC BLOOD PRESSURE: 172 MMHG | RESPIRATION RATE: 14 BRPM | DIASTOLIC BLOOD PRESSURE: 53 MMHG | BODY MASS INDEX: 25.51 KG/M2 | OXYGEN SATURATION: 100 %

## 2025-04-03 DIAGNOSIS — I25.10 ATHEROSCLEROTIC HEART DISEASE OF NATIVE CORONARY ARTERY W/OUT ANGINA PECTORIS: ICD-10-CM

## 2025-04-03 DIAGNOSIS — I10 ESSENTIAL (PRIMARY) HYPERTENSION: ICD-10-CM

## 2025-04-03 DIAGNOSIS — I50.30 UNSPECIFIED DIASTOLIC (CONGESTIVE) HEART FAILURE: ICD-10-CM

## 2025-04-03 PROCEDURE — 93000 ELECTROCARDIOGRAM COMPLETE: CPT

## 2025-04-03 PROCEDURE — 99214 OFFICE O/P EST MOD 30 MIN: CPT

## 2025-04-15 ENCOUNTER — APPOINTMENT (OUTPATIENT)
Dept: GERIATRICS | Facility: CLINIC | Age: 80
End: 2025-04-15
Payer: MEDICARE

## 2025-04-15 VITALS
TEMPERATURE: 97.6 F | WEIGHT: 157 LBS | HEART RATE: 57 BPM | HEIGHT: 66 IN | DIASTOLIC BLOOD PRESSURE: 54 MMHG | BODY MASS INDEX: 25.23 KG/M2 | OXYGEN SATURATION: 98 % | SYSTOLIC BLOOD PRESSURE: 158 MMHG

## 2025-04-15 DIAGNOSIS — F03.B2 UNSPECIFIED DEMENTIA, MODERATE, WITH PSYCHOTIC DISTURBANCE: ICD-10-CM

## 2025-04-15 DIAGNOSIS — Z79.4 TYPE 2 DIABETES MELLITUS WITH DIABETIC PERIPHERAL ANGIOPATHY W/OUT GANGRENE: ICD-10-CM

## 2025-04-15 DIAGNOSIS — H91.90 UNSPECIFIED HEARING LOSS, UNSPECIFIED EAR: ICD-10-CM

## 2025-04-15 DIAGNOSIS — E11.51 TYPE 2 DIABETES MELLITUS WITH DIABETIC PERIPHERAL ANGIOPATHY W/OUT GANGRENE: ICD-10-CM

## 2025-04-15 PROCEDURE — 99214 OFFICE O/P EST MOD 30 MIN: CPT

## 2025-04-15 PROCEDURE — G2211 COMPLEX E/M VISIT ADD ON: CPT

## 2025-04-17 RX ORDER — INSULIN LISPRO 200 [IU]/ML
200 INJECTION, SOLUTION SUBCUTANEOUS 3 TIMES DAILY
Qty: 1 | Refills: 1 | Status: ACTIVE | COMMUNITY
Start: 2025-04-17 | End: 1900-01-01

## 2025-05-15 ENCOUNTER — NON-APPOINTMENT (OUTPATIENT)
Age: 80
End: 2025-05-15

## 2025-05-20 ENCOUNTER — APPOINTMENT (OUTPATIENT)
Dept: GERIATRICS | Facility: CLINIC | Age: 80
End: 2025-05-20

## 2025-06-10 ENCOUNTER — APPOINTMENT (OUTPATIENT)
Dept: GERIATRICS | Facility: CLINIC | Age: 80
End: 2025-06-10
Payer: MEDICARE

## 2025-06-10 VITALS
WEIGHT: 158 LBS | SYSTOLIC BLOOD PRESSURE: 146 MMHG | DIASTOLIC BLOOD PRESSURE: 62 MMHG | TEMPERATURE: 97.4 F | HEIGHT: 66 IN | HEART RATE: 56 BPM | BODY MASS INDEX: 25.39 KG/M2 | OXYGEN SATURATION: 98 %

## 2025-06-10 PROCEDURE — 99215 OFFICE O/P EST HI 40 MIN: CPT

## 2025-06-10 PROCEDURE — G2211 COMPLEX E/M VISIT ADD ON: CPT

## 2025-07-28 ENCOUNTER — RESULT REVIEW (OUTPATIENT)
Age: 80
End: 2025-07-28

## 2025-07-29 ENCOUNTER — TRANSCRIPTION ENCOUNTER (OUTPATIENT)
Age: 80
End: 2025-07-29

## 2025-08-02 ENCOUNTER — RESULT REVIEW (OUTPATIENT)
Age: 80
End: 2025-08-02

## 2025-08-07 ENCOUNTER — APPOINTMENT (OUTPATIENT)
Dept: CARDIOLOGY | Facility: CLINIC | Age: 80
End: 2025-08-07
Payer: MEDICARE

## 2025-08-07 VITALS
BODY MASS INDEX: 25.39 KG/M2 | WEIGHT: 158 LBS | RESPIRATION RATE: 14 BRPM | OXYGEN SATURATION: 97 % | HEART RATE: 61 BPM | SYSTOLIC BLOOD PRESSURE: 120 MMHG | HEIGHT: 66 IN | DIASTOLIC BLOOD PRESSURE: 56 MMHG

## 2025-08-07 DIAGNOSIS — I50.20 UNSPECIFIED SYSTOLIC (CONGESTIVE) HEART FAILURE: ICD-10-CM

## 2025-08-07 DIAGNOSIS — I10 ESSENTIAL (PRIMARY) HYPERTENSION: ICD-10-CM

## 2025-08-07 DIAGNOSIS — I35.0 NONRHEUMATIC AORTIC (VALVE) STENOSIS: ICD-10-CM

## 2025-08-07 DIAGNOSIS — I25.10 ATHEROSCLEROTIC HEART DISEASE OF NATIVE CORONARY ARTERY W/OUT ANGINA PECTORIS: ICD-10-CM

## 2025-08-07 DIAGNOSIS — I50.30 UNSPECIFIED DIASTOLIC (CONGESTIVE) HEART FAILURE: ICD-10-CM

## 2025-08-07 PROCEDURE — 99214 OFFICE O/P EST MOD 30 MIN: CPT

## 2025-08-07 PROCEDURE — G2211 COMPLEX E/M VISIT ADD ON: CPT

## 2025-08-14 ENCOUNTER — RESULT REVIEW (OUTPATIENT)
Age: 80
End: 2025-08-14

## 2025-08-14 ENCOUNTER — APPOINTMENT (OUTPATIENT)
Dept: GERIATRICS | Facility: CLINIC | Age: 80
End: 2025-08-14
Payer: MEDICARE

## 2025-08-14 VITALS
HEIGHT: 66 IN | SYSTOLIC BLOOD PRESSURE: 120 MMHG | DIASTOLIC BLOOD PRESSURE: 70 MMHG | BODY MASS INDEX: 25.23 KG/M2 | WEIGHT: 157 LBS | HEART RATE: 68 BPM | OXYGEN SATURATION: 95 %

## 2025-08-14 DIAGNOSIS — R09.89 OTHER SPECIFIED SYMPTOMS AND SIGNS INVOLVING THE CIRCULATORY AND RESPIRATORY SYSTEMS: ICD-10-CM

## 2025-08-14 DIAGNOSIS — U07.1 COVID-19: ICD-10-CM

## 2025-08-14 DIAGNOSIS — R05.1 ACUTE COUGH: ICD-10-CM

## 2025-08-14 PROCEDURE — 99214 OFFICE O/P EST MOD 30 MIN: CPT

## 2025-08-14 RX ORDER — AZITHROMYCIN 250 MG/1
250 TABLET, FILM COATED ORAL
Qty: 1 | Refills: 0 | Status: ACTIVE | COMMUNITY
Start: 2025-08-14 | End: 1900-01-01

## 2025-08-14 RX ORDER — GUAIFENESIN 600 MG/1
600 TABLET, EXTENDED RELEASE ORAL
Qty: 10 | Refills: 1 | Status: COMPLETED | COMMUNITY
Start: 2025-08-14 | End: 2025-08-24

## 2025-09-11 ENCOUNTER — APPOINTMENT (OUTPATIENT)
Dept: GERIATRICS | Facility: CLINIC | Age: 80
End: 2025-09-11
Payer: MEDICARE

## 2025-09-11 VITALS
DIASTOLIC BLOOD PRESSURE: 58 MMHG | OXYGEN SATURATION: 96 % | TEMPERATURE: 96.3 F | BODY MASS INDEX: 25.5 KG/M2 | SYSTOLIC BLOOD PRESSURE: 130 MMHG | HEART RATE: 70 BPM | WEIGHT: 158 LBS

## 2025-09-11 DIAGNOSIS — G47.52 REM SLEEP BEHAVIOR DISORDER: ICD-10-CM

## 2025-09-11 DIAGNOSIS — Z99.2 END STAGE RENAL DISEASE: ICD-10-CM

## 2025-09-11 DIAGNOSIS — Z02.89 ENCOUNTER FOR OTHER ADMINISTRATIVE EXAMINATIONS: ICD-10-CM

## 2025-09-11 DIAGNOSIS — I50.20 UNSPECIFIED SYSTOLIC (CONGESTIVE) HEART FAILURE: ICD-10-CM

## 2025-09-11 DIAGNOSIS — N18.6 END STAGE RENAL DISEASE: ICD-10-CM

## 2025-09-11 PROCEDURE — 99214 OFFICE O/P EST MOD 30 MIN: CPT

## 2025-09-16 ENCOUNTER — APPOINTMENT (OUTPATIENT)
Dept: GERIATRICS | Facility: CLINIC | Age: 80
End: 2025-09-16
Payer: MEDICARE

## 2025-09-16 VITALS
WEIGHT: 151 LBS | HEART RATE: 52 BPM | TEMPERATURE: 95.9 F | SYSTOLIC BLOOD PRESSURE: 151 MMHG | BODY MASS INDEX: 24.27 KG/M2 | OXYGEN SATURATION: 99 % | HEIGHT: 66 IN | DIASTOLIC BLOOD PRESSURE: 63 MMHG

## 2025-09-16 DIAGNOSIS — E11.51 TYPE 2 DIABETES MELLITUS WITH DIABETIC PERIPHERAL ANGIOPATHY W/OUT GANGRENE: ICD-10-CM

## 2025-09-16 DIAGNOSIS — Z79.4 TYPE 2 DIABETES MELLITUS WITH DIABETIC PERIPHERAL ANGIOPATHY W/OUT GANGRENE: ICD-10-CM

## 2025-09-16 DIAGNOSIS — F03.B2 UNSPECIFIED DEMENTIA, MODERATE, WITH PSYCHOTIC DISTURBANCE: ICD-10-CM

## 2025-09-16 PROCEDURE — G2211 COMPLEX E/M VISIT ADD ON: CPT

## 2025-09-16 PROCEDURE — 99214 OFFICE O/P EST MOD 30 MIN: CPT

## 2025-09-16 RX ORDER — QUETIAPINE FUMARATE 25 MG/1
25 TABLET ORAL DAILY
Qty: 30 | Refills: 1 | Status: ACTIVE | COMMUNITY
Start: 2025-09-16 | End: 1900-01-01